# Patient Record
Sex: FEMALE | Race: WHITE | NOT HISPANIC OR LATINO | Employment: FULL TIME | URBAN - METROPOLITAN AREA
[De-identification: names, ages, dates, MRNs, and addresses within clinical notes are randomized per-mention and may not be internally consistent; named-entity substitution may affect disease eponyms.]

---

## 2018-06-14 LAB — PAP-ABSTRACT: NORMAL

## 2021-03-01 NOTE — PATIENT INSTRUCTIONS
Please send me a [employer] message with an update as to how you are doing in 2 weeks.  Contact me at any time if you are having concerning side effects with the medication.  You should note some improvement though it may be minor within 2 weeks of starting medication.  If tolerating well I would like to see you back in clinic in 4-6 weeks for an office visit.   Counseling is really important to your care.   Psychology today website/provider finder feature.    Welcome to [facility], where we are committed to the art of inspired primary care.  Thank you for choosing us to be a part of your well-being.    The clinic is open Monday through Friday, 8:00 a.m. - 5:00 p.m., and Saturdays from 8:00 a.m. - 12:00 p.m.  After-hours questions are directed to our 24-hour nurse line, which can be reached by calling the clinic at [phone].  You can also contact the clinic through [employer], our online patient portal.  (Please allow 1-2 business days for a response via [employer].)  If you are not already enrolled in [employer], access instructions are below.    If you need a refill on your prescription, please contact the pharmacy where you filled it, and they will contact our clinic with the details of what is needed.  If your prescription is a controlled substance, you will have a conversation with your provider to determine if you would like to  your prescription at the clinic or have it mailed to your pharmacy.  Please allow 2-3 business days for all refill requests to be handled.    We look forward to providing you with great care!    Preventive Health Recommendations  Female Ages 21 to 25     Yearly exam:     See your health care provider every year in order to  o Review health changes.   o Discuss preventive care.    o Review your medicines if your doctor has prescribed any.      You should be tested each year for STDs (sexually transmitted diseases).       Talk to your provider about  how often you should have cholesterol testing.      Get a Pap test every three years. If you have an abnormal result, your doctor may have you test more often.      If you are at risk for diabetes, you should have a diabetes test (fasting glucose).     Shots:     Get a flu shot each year.     Get a tetanus shot every 10 years.     Consider getting the shot (vaccine) that prevents cervical cancer (Gardasil).    Nutrition:     Eat at least 5 servings of fruits and vegetables each day.    Eat whole-grain bread, whole-wheat pasta and brown rice instead of white grains and rice.    Get adequate Calcium and Vitamin D.     Lifestyle    Exercise at least 150 minutes a week each week (30 minutes a day, 5 days a week). This will help you control your weight and prevent disease.    Limit alcohol to one drink per day.    No smoking.     Wear sunscreen to prevent skin cancer.    See your dentist every six months for an exam and cleaning.  Patient Education     Understanding Generalized Anxiety Disorder (ASHANTI)  Anxiety can fill you with worry and fear. Sometimes anxiety is healthy. It alerts you to a potential threat and gets you to respond and take action. But for some people, anxiety gets so bad it causes problems in daily life. If you find yourself in a constant state of anxiety, you may have an anxiety disorder called generalized anxiety disorder (ASHANTI). Speak with your healthcare provider or mental health professional to learn more. He or she can help.      What is generalized anxiety disorder?  ASHANTI means that you are worrying constantly and can t control the worrying. Healthcare providers diagnose ASHANTI when your worrying happens on most days and for at least 6 months.   With ASHANTI, you might worry about money, your family and friends, work, or the world in general. You might not even be sure what you're anxious about. But whatever it is, you have an intense fear that the worst will happen. These feelings never really go away. In  people age 65 and older, ASHANTI is one of the most commonly diagnosed anxiety disorders.  Many times it occurs with depression. This constant worry affects your quality of life and makes it hard to function. ASHANTI can cause physical symptoms, too.   What are common symptoms of generalized anxiety disorder?  People with ASHANTI often think they have a physical illness. The disorder can cause symptoms, such as:   Muscle tension, especially in the neck and shoulders  Nausea and stomach problems  Frequent headaches  Feeling lightheaded  Restlessness, trouble sleeping  Feeling irritable and on edge all the time  How can generalized anxiety disorder be treated?  ASHANTI can be treated with medicine, talk therapy (also called counseling), or both. Medicine helps to reduce symptoms, so you can continue with your daily routine. Therapy helps you understand the cause of your anxiety and learn how to manage it. Both forms of treatment help you deal with problems that anxiety causes in your life. This helps you to be healthier and happier.   [employer] last reviewed this educational content on 5/1/2020 2000-2020 [employer]. [address] [place] [address], [place] [address]. All rights reserved. This information is not intended as a substitute for professional medical care. Always follow your healthcare professional's instructions.

## 2021-03-01 NOTE — PROGRESS NOTES
Assessment & Plan     Abdominal pain, generalized: Unclear etiology suspect there may be component of irritable bowel syndrome but also wonder about gastroparesis as well as other causes of chronic constipation.  - GASTROENTEROLOGY ADULT REF CONSULT ONLY; Future    Nausea  - GASTROENTEROLOGY ADULT REF CONSULT ONLY; Future    ASHANTI (generalized anxiety disorder): Risks and benefits of treatment discussed.  Lifestyle treatments discussed and encouraged.  Counseling recommended.  Also think it would be interesting to see if treatment of anxiety has any effect at all on her abdominal issues.  Certainly it may help to mitigate them at least a bit.  - FLUoxetine (PROZAC) 10 MG capsule; Take 1 capsule (10 mg) by mouth daily Can increase to 2 capsules after 7 days if tolerating well.  Please send me a [employer] message with an update as to how you are doing in 2 weeks.  Contact me at any time if you are having concerning side effects with the medication.  You should note some improvement though it may be minor within 2 weeks of starting medication.  If tolerating well I would like to see you back in clinic in 4-6 weeks for an office visit.   Counseling is really important to your care.   Psychology today website/provider finder feature.  Chronic constipation  - GASTROENTEROLOGY ADULT REF CONSULT ONLY; Future  Recommended drinking plenty of fluids throughout the day.    Fiber supplement with fiber Gummies start with 1 twice daily increasing by 1 each week to a maximum of 3 twice daily.    Mild episode of recurrent major depressive disorder (H)  Same as above    Abdominal pain, epigastric  - GASTROENTEROLOGY ADULT REF CONSULT ONLY; Future  Encouraged her to give a trial of omeprazole 20 mg over-the-counter once daily at least until she gets in to see the gastroenterologist.    May-Thurner syndrome  Change from 481 mg baby aspirin to 1 enteric-coated 325 mg aspirin    History of pulmonary embolism    Hx of  "hemorrhoids    Vitamin D deficiency  Recommended vitamin D supplement every day 2000 international units winter spring summer and fall.    Consider physical in the next month or so.  Her follow-up on anxiety can be scheduled as a physical and we can take care of both problems at that time as things are progressing favorably.        60 minutes spent on the date of the encounter doing chart review, review of outside records, review of test results, interpretation of tests, patient visit and documentation        BMI:   Estimated body mass index is 29.69 kg/m  as calculated from the following:    Height as of this encounter: 1.593 m (5' 2.72\").    Weight as of this encounter: 75.4 kg (166 lb 1.9 oz).           Return in about 4 weeks (around 3/30/2021).    Daly Johnson PA-C  [facility]    Subjective   Freida is a 25 year old new patient to our clinic.  Appointment was made for physical but she has several more acute issues we chose to focus on today.  These primarily revolve around to main issues:    Problem #1: Generalized abdominal and pelvic pain associated with nausea, chronic constipation, gas and bloating.  She has been seen for this in the past but due to [place] to different [place] because of schooling and work she has not been able to get consistent follow-up care.  Most recent evaluation for similar issues [facility]  At that note and evaluation were reviewed. She is somewhat frustrated at not getting to the bottom of what is going on.  Description: Stomach issues started for 5 years ago when she was in college.  Character: Sharp stabbing abdominal pain and pelvic pain usually worse just prior to BM. Occurs in multiple areas all over the lower abdomen.  Occurs for seconds at a time.    Pain is 8/10 at its worse.  No pain curently. Has pain most everyday but occasionally no pain.  Location: throughout the lower abdomen.   Radiation: None  Intensity: moderate--Does not miss " work but says she cannot do njormal daily activities.  Progression of Symptoms:  same and waxing and waning  Accompanying Signs & Symptoms:  Fever/Chills: no  Gas/Bloating: yes  Nausea: YES--nausea can be quite severe and persistent.  There is no associated vomiting and she has a severe phobia of vomiting. Has severe nausea that is unrelated to the lower abdominal pain.  May be triggered by over eating.  Alcohol really aggravates her upper abdominal pain.  Vomitting: no  Diarrhea: no  Constipation: YES--sometimes.  She has an ongoing history with constipation.  Associated with this she has had bleeding hemorrhoids with hemorrhoidectomy.  She has had Botox injections rectally and treatment for fissure.  Dysuria or Hematuria: no  History:   Trauma: no  Previous similar pain: no  Previous tests done: [facility].  Precipitating factors:   Does the pain change with:     Food: YES    Bowel Movement: YES    Urination: no   Other factors:  no  Therapies tried and outcome:  Omeprazole did help GERD symptoms but she does not feel that is playing a role currently.    GYN history:  Periods: regular  Flow described as moderate  yes dysmenorrhea,  Currently sexually active: never    Contraception: Not needed  Patient's last menstrual period was 2021.  Vaginal symptoms: none  Concern for STD: no        Abnormal Mood Symptoms:  Known history of anxiety but has never been treated. Family history of anxiety.  Onset/Duration: Several years.  Current ongoing medical issues are playing a role.  She also has a very stressful job and has had several recent big life changes.  Description: Significant anxiety and worry over the past several years.  Has noted more depression sadness and feeling down related to her ongoing medical issues and concerns.  Depression (if yes, do PHQ-9): YES  Anxiety (if yes, do ASHANTI-7): YES  Accompanying Signs & Symptoms:  Still participating in activities that you used to enjoy: YES-but  "decreased  Fatigue: YES  Irritability: YES  Difficulty concentrating: YES  Changes in appetite: YES-related to her stomach issues.  Problems with sleep: YES  Heart racing/beating fast: YES  Abnormally elevated, expansive, or irritable mood: no  Persistently increased activity or energy: no  Thoughts of hurting yourself or others: no  History:  Recent stress or major life event: YES-Covid pandemic, completing schooling, moved to a new state, new job  Prior depression or anxiety: Long standing history of anxiety no previous treatment.  Family history of depression or anxiety: YES  Alcohol/drug use: no  Difficulty sleeping: no  Precipitating or alleviating factors: None  Therapies tried and outcome: none  PHQ 3/2/2021   PHQ-9 Total Score 8   Q9: Thoughts of better off dead/self-harm past 2 weeks Not at all     ASHANTI-7 SCORE 3/2/2021   Total Score 13     Other important past medical history: Large inguinal DVT with bilateral diffuse pulmonary emboli.  Occurred when she was on estrogen-containing birth control pills.  Work-up revealed May Thurner syndrome.  She was on anticoagulants for 1 to 2 years.  Was found to be allergic to clopidogrel and Xarelto.  And was then placed on Coumadin.  She had inguinal femoral stent placed following embolectomy.  Currently taking 4 baby aspirin daily.  Vitamin D deficiency    Review of Systems   Constitutional, HEENT, cardiovascular, pulmonary, GI, , musculoskeletal, neuro, skin, endocrine and psych systems are negative, except as otherwise noted.      Objective    /79   Pulse 105   Temp 97.3  F (36.3  C)   Ht 1.593 m (5' 2.72\")   Wt 75.4 kg (166 lb 1.9 oz)   LMP 02/20/2021   SpO2 95%   Breastfeeding No   BMI 29.69 kg/m    Body mass index is 29.69 kg/m .  Physical Exam   GENERAL: healthy, alert and no distress  NECK: no adenopathy, no asymmetry, masses, or scars and thyroid normal to palpation.  No bruits  RESP: lungs clear to auscultation - no rales, rhonchi or " wheezes  CV: regular rate and rhythm, normal S1 S2, no S3 or S4, no murmur, click or rub, no peripheral edema and peripheral pulses strong  ABDOMEN: Hypoactive bowel sounds throughout.  No hepatosplenomegaly.  No palpable masses.  Mild tenderness in the epigastric area as well as generally throughout the lower abdomen without rebound or guarding.  MS: no gross musculoskeletal defects noted, no clubbing, edema or cyanosis of extremities.  SKIN: no suspicious lesions or rashes  PSYCH: well dressed and groomed.  Good eye contact and is cooperative. Thoughts linear.  No delusions, compulsions or paranoia.  Affect flat.  Patient denies homicidal and suicidal ideation as well as no thoughts or actions of self-harm.

## 2021-03-02 ENCOUNTER — OFFICE VISIT (OUTPATIENT)
Dept: FAMILY MEDICINE | Facility: CLINIC | Age: 26
End: 2021-03-02

## 2021-03-02 VITALS
SYSTOLIC BLOOD PRESSURE: 126 MMHG | OXYGEN SATURATION: 95 % | DIASTOLIC BLOOD PRESSURE: 79 MMHG | HEIGHT: 63 IN | TEMPERATURE: 97.3 F | WEIGHT: 166.12 LBS | HEART RATE: 105 BPM | BODY MASS INDEX: 29.43 KG/M2

## 2021-03-02 DIAGNOSIS — F41.1 GAD (GENERALIZED ANXIETY DISORDER): ICD-10-CM

## 2021-03-02 DIAGNOSIS — Z87.19 HX OF HEMORRHOIDS: ICD-10-CM

## 2021-03-02 DIAGNOSIS — R10.13 ABDOMINAL PAIN, EPIGASTRIC: ICD-10-CM

## 2021-03-02 DIAGNOSIS — F33.0 MILD EPISODE OF RECURRENT MAJOR DEPRESSIVE DISORDER (H): ICD-10-CM

## 2021-03-02 DIAGNOSIS — R10.84 ABDOMINAL PAIN, GENERALIZED: Primary | ICD-10-CM

## 2021-03-02 DIAGNOSIS — I87.1 MAY-THURNER SYNDROME: ICD-10-CM

## 2021-03-02 DIAGNOSIS — E55.9 VITAMIN D DEFICIENCY: ICD-10-CM

## 2021-03-02 DIAGNOSIS — R11.0 NAUSEA: ICD-10-CM

## 2021-03-02 DIAGNOSIS — K59.09 CHRONIC CONSTIPATION: ICD-10-CM

## 2021-03-02 DIAGNOSIS — Z86.711 HISTORY OF PULMONARY EMBOLISM: ICD-10-CM

## 2021-03-02 PROBLEM — Z83.49 FAMILY HISTORY OF HYPOTHYROIDISM: Status: ACTIVE | Noted: 2020-02-14

## 2021-03-02 PROBLEM — I82.419 ACUTE DEEP VEIN THROMBOSIS (DVT) OF FEMORAL VEIN (H): Status: ACTIVE | Noted: 2021-03-02

## 2021-03-02 PROBLEM — K21.9 GASTROESOPHAGEAL REFLUX DISEASE: Status: ACTIVE | Noted: 2020-02-14

## 2021-03-02 RX ORDER — MULTIPLE VITAMINS W/ MINERALS TAB 9MG-400MCG
TAB ORAL
COMMUNITY
Start: 2020-03-01

## 2021-03-02 RX ORDER — FLUOXETINE 10 MG/1
10 CAPSULE ORAL DAILY
Qty: 60 CAPSULE | Refills: 1 | Status: SHIPPED | OUTPATIENT
Start: 2021-03-02 | End: 2021-04-21

## 2021-03-02 RX ORDER — ASPIRIN 81 MG/1
81 TABLET, CHEWABLE ORAL
COMMUNITY
Start: 2020-02-14 | End: 2022-06-09

## 2021-03-02 RX ORDER — NITROFURANTOIN MACROCRYSTAL 100 MG
CAPSULE ORAL
COMMUNITY
Start: 2021-02-28 | End: 2021-04-21

## 2021-03-02 RX ORDER — VITAMIN B COMPLEX
TABLET ORAL
COMMUNITY
Start: 2020-02-16

## 2021-03-02 SDOH — HEALTH STABILITY: MENTAL HEALTH: HOW OFTEN DO YOU HAVE A DRINK CONTAINING ALCOHOL?: NEVER

## 2021-03-02 SDOH — HEALTH STABILITY: MENTAL HEALTH: HOW MANY STANDARD DRINKS CONTAINING ALCOHOL DO YOU HAVE ON A TYPICAL DAY?: NOT ASKED

## 2021-03-02 SDOH — HEALTH STABILITY: MENTAL HEALTH: HOW OFTEN DO YOU HAVE 6 OR MORE DRINKS ON ONE OCCASION?: NEVER

## 2021-03-02 ASSESSMENT — ANXIETY QUESTIONNAIRES
3. WORRYING TOO MUCH ABOUT DIFFERENT THINGS: MORE THAN HALF THE DAYS
7. FEELING AFRAID AS IF SOMETHING AWFUL MIGHT HAPPEN: SEVERAL DAYS
5. BEING SO RESTLESS THAT IT IS HARD TO SIT STILL: SEVERAL DAYS
1. FEELING NERVOUS, ANXIOUS, OR ON EDGE: NEARLY EVERY DAY
6. BECOMING EASILY ANNOYED OR IRRITABLE: SEVERAL DAYS
IF YOU CHECKED OFF ANY PROBLEMS ON THIS QUESTIONNAIRE, HOW DIFFICULT HAVE THESE PROBLEMS MADE IT FOR YOU TO DO YOUR WORK, TAKE CARE OF THINGS AT HOME, OR GET ALONG WITH OTHER PEOPLE: VERY DIFFICULT
GAD7 TOTAL SCORE: 13
2. NOT BEING ABLE TO STOP OR CONTROL WORRYING: NEARLY EVERY DAY

## 2021-03-02 ASSESSMENT — PATIENT HEALTH QUESTIONNAIRE - PHQ9
5. POOR APPETITE OR OVEREATING: MORE THAN HALF THE DAYS
SUM OF ALL RESPONSES TO PHQ QUESTIONS 1-9: 8

## 2021-03-02 ASSESSMENT — MIFFLIN-ST. JEOR: SCORE: 1473.15

## 2021-03-02 NOTE — NURSING NOTE
"25 year old  Chief Complaint   Patient presents with     [facility]     Physical     pap exam       Blood pressure 126/79, pulse 105, temperature 97.3  F (36.3  C), height 1.593 m (5' 2.72\"), weight 75.4 kg (166 lb 1.9 oz), SpO2 95 %, not currently breastfeeding. Body mass index is 29.69 kg/m .  There is no problem list on file for this patient.      Wt Readings from Last 2 Encounters:   03/02/21 75.4 kg (166 lb 1.9 oz)     BP Readings from Last 3 Encounters:   03/02/21 126/79         Current Outpatient Medications   Medication     aspirin (ASA) 81 MG chewable tablet     multivitamin w/minerals (MULTI-VITAMIN) tablet     nitroFURantoin macrocrystal (MACRODANTIN) 100 MG capsule     Vitamin D3 (CHOLECALCIFEROL) 25 mcg (1000 units) tablet     No current facility-administered medications for this visit.        Social History     Tobacco Use     Smoking status: Never Smoker     Smokeless tobacco: Never Used   Substance Use Topics     Alcohol use: Never     Frequency: Never     Binge frequency: Never     Drug use: Never       Health Maintenance Due   Topic Date Due     CHLAMYDIA SCREENING  1997     ADVANCE CARE PLANNING  1997     DTAP/TDAP/TD IMMUNIZATION (1 - Tdap) 06/14/2004     HPV IMMUNIZATION (1 - 2-dose series) 06/14/2008     HIV SCREENING  06/14/2012     HEPATITIS C SCREENING  06/14/2015     PAP  06/14/2018     PHQ-2  01/01/2021       No results found for: PAP      March 2, 2021 4:04 PM  "

## 2021-03-03 ASSESSMENT — ANXIETY QUESTIONNAIRES: GAD7 TOTAL SCORE: 13

## 2021-03-06 PROBLEM — R10.84 ABDOMINAL PAIN, GENERALIZED: Status: ACTIVE | Noted: 2021-03-06

## 2021-03-06 PROBLEM — F32.9 MAJOR DEPRESSION: Status: ACTIVE | Noted: 2021-03-06

## 2021-03-06 PROBLEM — R11.0 NAUSEA: Status: ACTIVE | Noted: 2021-03-06

## 2021-03-06 PROBLEM — I87.1 MAY-THURNER SYNDROME: Status: ACTIVE | Noted: 2021-03-06

## 2021-03-06 PROBLEM — Z86.711 HISTORY OF PULMONARY EMBOLISM: Status: ACTIVE | Noted: 2021-03-06

## 2021-03-06 PROBLEM — Z87.19 HX OF HEMORRHOIDS: Status: ACTIVE | Noted: 2020-02-14

## 2021-03-06 PROBLEM — E55.9 VITAMIN D DEFICIENCY: Status: ACTIVE | Noted: 2021-03-06

## 2021-03-06 PROBLEM — K59.09 CHRONIC CONSTIPATION: Status: ACTIVE | Noted: 2021-03-06

## 2021-03-06 SDOH — HEALTH STABILITY: MENTAL HEALTH: HOW MANY STANDARD DRINKS CONTAINING ALCOHOL DO YOU HAVE ON A TYPICAL DAY?: 1 OR 2

## 2021-03-07 ENCOUNTER — HEALTH MAINTENANCE LETTER (OUTPATIENT)
Age: 26
End: 2021-03-07

## 2021-04-07 NOTE — TELEPHONE ENCOUNTER
REFERRAL INFORMATION:    Referring Provider:  Dr Daly Johnson     Referring Clinic:  [facility]    Reason for Visit/Diagnosis: Ongoing generalized lower abdominal pain with nausea, chronic constipation     FUTURE VISIT INFORMATION:    Appointment Date: 4/21/21    Appointment Time: 4pm     NOTES STATUS DETAILS   OFFICE NOTE from Referring Provider Diane GRIFFIN @ [facility]:  3/2/21     OFFICE NOTE from Other Specialist Care Everywhere Julia Burgess @ [facility]:  2/14/20 7/29/19 Office visit with Dr. Juanito Latif ([facility])     6/25/19 Office visit with Dr. Abi Santiago ([facility])    HOSPITAL DISCHARGE SUMMARY/  ED VISITS N/A    OPERATIVE REPORT N/A    MEDICATION LIST Internal/ Care Everywhere         ENDOSCOPY  N/A    COLONOSCOPY N/A    ERCP N/A    EUS N/A    STOOL TESTING Care Everywhere [facility]:  2/14/20   PERTINENT LABS Care Everywhere    PATHOLOGY REPORTS (RELATED) N/A    IMAGING (CT, MRI, EGD, MRCP, Small Bowel Follow Through/SBT, MR/CT Enterography) N/A      Phone Call:  4/7/21 MV 6.06pm   Contact Name Freida Montemayor   Terence Called and left patient a voicemail to see where her external GI records are located in [place]. (Per appt notes, pt has outside recs). Anuradha Elliott's call back number [phone]     4/15/2021 2:23pm Called and spoke with Pt. Pt mentioned that she has outside recs with [facility]. Lucho

## 2021-04-21 ENCOUNTER — PRE VISIT (OUTPATIENT)
Dept: GASTROENTEROLOGY | Facility: CLINIC | Age: 26
End: 2021-04-21

## 2021-04-21 ENCOUNTER — VIRTUAL VISIT (OUTPATIENT)
Dept: GASTROENTEROLOGY | Facility: CLINIC | Age: 26
End: 2021-04-21
Payer: COMMERCIAL

## 2021-04-21 VITALS — HEIGHT: 63 IN | BODY MASS INDEX: 29.41 KG/M2 | WEIGHT: 166 LBS

## 2021-04-21 DIAGNOSIS — I87.1 MAY-THURNER SYNDROME: Primary | ICD-10-CM

## 2021-04-21 DIAGNOSIS — R10.13 ABDOMINAL PAIN, EPIGASTRIC: ICD-10-CM

## 2021-04-21 DIAGNOSIS — R11.0 NAUSEA: ICD-10-CM

## 2021-04-21 DIAGNOSIS — R10.84 ABDOMINAL PAIN, GENERALIZED: ICD-10-CM

## 2021-04-21 DIAGNOSIS — K59.09 CHRONIC CONSTIPATION: ICD-10-CM

## 2021-04-21 PROCEDURE — 99204 OFFICE O/P NEW MOD 45 MIN: CPT | Mod: 95 | Performed by: INTERNAL MEDICINE

## 2021-04-21 ASSESSMENT — MIFFLIN-ST. JEOR: SCORE: 1477.1

## 2021-04-21 ASSESSMENT — PAIN SCALES - GENERAL: PAINLEVEL: NO PAIN (0)

## 2021-04-21 NOTE — PROGRESS NOTES
"Freida Aguilar is a 25 year old female who is being evaluated via a billable video visit.      The patient has been notified of following:     \"This video visit will be conducted via a call between you and your physician/provider. We have found that certain health care needs can be provided without the need for an in-person physical exam.  This service lets us provide the care you need with a video conversation.  If a prescription is necessary we can send it directly to your pharmacy.  If lab work is needed we can place an order for that and you can then stop by our lab to have the test done at a later time.    If during the course of the call the physician/provider feels a video visit is not appropriate, you will not be charged for this service.\"       Patient confirmed that they are in [place] for today's visit Yes    Video-Visit Details  Type of service:  Video Visit    Video Start Time: 4:25  Video End Time:  5:12    Originating Location (pt. Location): Other place of work    Distant Location (provider location):  [facility] [place]     Platform used: [facility]    GASTROENTEROLOGY NEW PATIENT VIDEO VISIT    CC/REFERRING MD:    Daly Johnson    REASON FOR CONSULTATION:   Daly Johnson for   Chief Complaint   Patient presents with     Consult     Virtual consult       HISTORY OF PRESENT ILLNESS:    Freida Aguilar is a 25 year old female who is being evaluated via a billable video visit.      Sx ongoing 5-6 yrs:  Cannot burp, acid reflux, gurgling gas, sharp pains lower abdomen prior to BM, nausea coming in waves, no vomiting.  Sometimes wakes up with what feels like a lump of bile in her throat.  Feels her symptoms are worse on stressful days.  Being social is somewhat stressful and this anxiety has increased during the pandemic.    Constipation is a symptom but this is not new.  Occasional streaks of blood in her stool.    2 yrs ago 9/2019 " presented to a GI clinic in [place].  S/p hemorrhoid procedure there and botox injection for anal fissure. - the plan was to review her above symptoms at a later appt however then the patient moved for a job.    Has tried PPI  Has also tried fiber gummies 12 g/day    Med hx/  On baby ASA qdaily.  5/2016 was dx'd with DVT May-Thurner syndrome which included admission to ICU and stent placement.      Social/  Works for [place] [employer] in [facility] - "[employer] technology running  Freelance: edits podcasts  Will start overnight shifts soon  Admits to stress and anxiety including social anxiety  From [place]    I have reviewed and updated the patient's Past Medical History, Social History, Family History and Medication List.    PERTINENT PAST MEDICAL HISTORY:  (I personally reviewed this history with the patient at today's visit)   No past medical history on file.      PREVIOUS SURGERIES: (I personally reviewed this history with the patient at today's visit)   Past Surgical History:   Procedure Laterality Date     HEMORRHOID SURGERY       IR STENT VASCULAR LT Left        ALLERGIES:     Allergies   Allergen Reactions     Clopidogrel Hives, Itching and Swelling     Rivaroxaban Hives and Itching       PERTINENT MEDICATIONS:    Current Outpatient Medications:      hyoscyamine SL (LEVSIN/SL) 0.125 MG sublingual tablet, Take 1 tablet (0.125 mg) by mouth 4 times daily as needed (abdominal cramping), Disp: 60 tablet, Rfl: 3     aspirin (ASA) 81 MG chewable tablet, Take 81 mg by mouth, Disp: , Rfl:      citalopram (CELEXA) 10 MG tablet, Take 1 tablet (10 mg) by mouth daily, Disp: 30 tablet, Rfl: 1     multivitamin w/minerals (MULTI-VITAMIN) tablet, , Disp: , Rfl:      Vitamin D3 (CHOLECALCIFEROL) 25 mcg (1000 units) tablet, , Disp: , Rfl:     SOCIAL HISTORY:  Social History     Occupational History     Not on file   Tobacco Use     Smoking status: Never Smoker     Smokeless tobacco: Never Used   Substance and Sexual Activity     Alcohol use:  Not Currently     Frequency: Never     Drinks per session: 1 or 2     Binge frequency: Never     Drug use: Not Currently     Sexual activity: Never     Partners: Male       FAMILY HISTORY:   Family History   Problem Relation Age of Onset     Kidney Cancer Maternal Grandfather      Lung Cancer Paternal Grandfather       The patient notes that her mother had a low T3 and normal TSH as presenting labs for dx of thyroid dz   F - hemorrhoids  No CRC  No colon polyps  No panc or liver dz  Unsure about IBD  M - food sensitivities      Review of Systems  Review of Systems   Constitutional: Positive for weight loss. Negative for diaphoresis and fever.        180 to 160 lbs over 2 yrs   Gastrointestinal: Positive for blood in stool and constipation. Negative for heartburn.        No dysphagia   Neurological: Positive for dizziness.        Dizziness improves if eats carb such as a donut        Exam:    General appearance:  Pleasant, in no acute distress  Ears, nose, mouth and throat: Hearing intact  Respiratory:  Normal respiration, no use of accessory muscles   Psychiatric:  Oriented to person, place and time, Appropriate mood and affect.       PERTINENT STUDIES have been reviewed.  2/2020 labs: glucose 69, H p stool ag neg      Impression/Recommendations:    Freida Aguilar is a 25 year old female who presents for evaluation of gas, nausea, and acid reflux.  She also has a long standing hx of constipation as well.  ddx includes celiac vs SIBO vs thyroid dz vs PUD vs other.  -- labs  -- trial Levsin for cramping prior to BM  -- carafate may be another option for reflux but this could worsen constipation  -- egd and colonoscopy - streaks of blood in stool likely related to fissure or hemorrhoids however colonoscopy for complete evaluation is warranted  -- before scheduling procedures, refer to Hematology for their opinion on medical management and imaging surveillance of patient with hx DVT/May-Thurners s/p stent  placement  -- conservative measures for now regarding constipation: 25g fiber/day, drinkable yogurt, squatty potty, fruit at bedtime or after dinner  -- consider breath test in future.  SIBO less likely in context of constipation   -- cont to monitor glucose which was low last year  RTC in 2-3 mo      Dionne Soliz MD      Thank you for this consultation.  It was a pleasure to participate in the care of this patient; please contact us with any further questions.      Time spent in appointment today was 47 minutes.

## 2021-04-21 NOTE — NURSING NOTE
"Chief Complaint   Patient presents with     Consult     Virtual consult       Vitals:    04/21/21 1530   Weight: 75.3 kg (166 lb)   Height: 1.6 m (5' 3\")       Body mass index is 29.41 kg/m .      CRIS BarnesT                      "

## 2021-04-21 NOTE — LETTER
"    4/21/2021         RE: Freida Aguilar  [address]  [place] [address]        Dear Colleague,    Thank you for referring your patient, Freida Aguilar, to the [facility] [place]. Please see a copy of my visit note below.    Freida Aguilar is a 25 year old female who is being evaluated via a billable video visit.      The patient has been notified of following:     \"This video visit will be conducted via a call between you and your physician/provider. We have found that certain health care needs can be provided without the need for an in-person physical exam.  This service lets us provide the care you need with a video conversation.  If a prescription is necessary we can send it directly to your pharmacy.  If lab work is needed we can place an order for that and you can then stop by our lab to have the test done at a later time.    If during the course of the call the physician/provider feels a video visit is not appropriate, you will not be charged for this service.\"       Patient confirmed that they are in [place] for today's visit Yes    Video-Visit Details  Type of service:  Video Visit    Video Start Time: 4:25  Video End Time:  5:12    Originating Location (pt. Location): Other place of work    Distant Location (provider location):  [facility] [place]     Platform used: [employer]    GASTROENTEROLOGY NEW PATIENT VIDEO VISIT    CC/REFERRING MD:    Daly Johnson    REASON FOR CONSULTATION:   Daly Johnson for   Chief Complaint   Patient presents with     Consult     Virtual consult       HISTORY OF PRESENT ILLNESS:    Freida Aguilar is a 25 year old female who is being evaluated via a billable video visit.      Sx ongoing 5-6 yrs:  Cannot burp, acid reflux, gurgling gas, sharp pains lower abdomen prior to BM, nausea coming in waves, no vomiting.  Sometimes wakes up with what feels like " a lump of bile in her throat.  Feels her symptoms are worse on stressful days.  Being social is somewhat stressful and this anxiety has increased during the pandemic.    Constipation is a symptom but this is not new.  Occasional streaks of blood in her stool.    2 yrs ago 9/2019 presented to a [place] clinic in [place].  S/p hemorrhoid procedure there and botox injection for anal fissure. - the plan was to review her above symptoms at a later appt however then the patient moved for a job.    Has tried PPI  Has also tried fiber gummies 12 g/day    Med hx/  On baby ASA qdaily.  5/2016 was dx'd with DVT May-Thurner syndrome which included admission to ICU and stent placement.      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    PERTINENT PAST MEDICAL HISTORY:  (I personally reviewed this history with the patient at today's visit)   No past medical history on file.      PREVIOUS SURGERIES: (I personally reviewed this history with the patient at today's visit)   Past Surgical History:   Procedure Laterality Date     HEMORRHOID SURGERY       IR STENT VASCULAR LT Left        ALLERGIES:     Allergies   Allergen Reactions     Clopidogrel Hives, Itching and Swelling     Rivaroxaban Hives and Itching       PERTINENT MEDICATIONS:    Current Outpatient Medications:      hyoscyamine SL (LEVSIN/SL) 0.125 MG sublingual tablet, Take 1 tablet (0.125 mg) by mouth 4 times daily as needed (abdominal cramping), Disp: 60 tablet, Rfl: 3     aspirin (ASA) 81 MG chewable tablet, Take 81 mg by mouth, Disp: , Rfl:      citalopram (CELEXA) 10 MG tablet, Take 1 tablet (10 mg) by mouth daily, Disp: 30 tablet, Rfl: 1     multivitamin w/minerals (MULTI-VITAMIN) tablet, , Disp: , Rfl:       Vitamin D3 (CHOLECALCIFEROL) 25 mcg (1000 units) tablet, , Disp: , Rfl:     SOCIAL HISTORY:  Social History     Occupational History     Not on file   Tobacco Use     Smoking status: Never Smoker     Smokeless tobacco: Never Used   Substance and Sexual Activity     Alcohol use: Not Currently     Frequency: Never     Drinks per session: 1 or 2     Binge frequency: Never     Drug use: Not Currently     Sexual activity: Never     Partners: Male       FAMILY HISTORY:   Family History   Problem Relation Age of Onset     Kidney Cancer Maternal Grandfather      Lung Cancer Paternal Grandfather       The patient notes that her mother had a low T3 and normal TSH as presenting labs for dx of thyroid dz   F - hemorrhoids  No CRC  No colon polyps  No panc or liver dz  Unsure about IBD  M - food sensitivities      Review of Systems  Review of Systems   Constitutional: Positive for weight loss. Negative for diaphoresis and fever.        180 to 160 lbs over 2 yrs   Gastrointestinal: Positive for blood in stool and constipation. Negative for heartburn.        No dysphagia   Neurological: Positive for dizziness.        Dizziness improves if eats carb such as a donut        Exam:    General appearance:  Pleasant, in no acute distress  Ears, nose, mouth and throat: Hearing intact  Respiratory:  Normal respiration, no use of accessory muscles   Psychiatric:  Oriented to person, place and time, Appropriate mood and affect.       PERTINENT STUDIES have been reviewed.  2/2020 labs: glucose 69, H p stool ag neg      Impression/Recommendations:    Freida Aguilar is a 25 year old female who presents for evaluation of gas, nausea, and acid reflux.  She also has a long standing hx of constipation as well.  ddx includes celiac vs SIBO vs thyroid dz vs PUD vs other.  -- labs  -- trial Levsin for cramping prior to BM  -- carafate may be another option for reflux but this could worsen constipation  -- egd and colonoscopy - streaks of blood in  stool likely related to fissure or hemorrhoids however colonoscopy for complete evaluation is warranted  -- before scheduling procedures, refer to Hematology for their opinion on medical management and imaging surveillance of patient with hx DVT/May-Thurners s/p stent placement  -- conservative measures for now regarding constipation: 25g fiber/day, drinkable yogurt, squatty potty, fruit at bedtime or after dinner  -- consider breath test in future.  SIBO less likely in context of constipation   -- cont to monitor glucose which was low last year  RTC in 2-3 mo      Dionne Soliz MD      Thank you for this consultation.  It was a pleasure to participate in the care of this patient; please contact us with any further questions.      Time spent in appointment today was 47 minutes.

## 2021-04-26 DIAGNOSIS — R11.0 NAUSEA: ICD-10-CM

## 2021-04-26 DIAGNOSIS — K59.09 CHRONIC CONSTIPATION: ICD-10-CM

## 2021-04-26 DIAGNOSIS — R10.84 ABDOMINAL PAIN, GENERALIZED: ICD-10-CM

## 2021-04-26 LAB
ALBUMIN SERPL-MCNC: 4.2 G/DL (ref 3.4–5)
ALP SERPL-CCNC: 46 U/L (ref 40–150)
ALT SERPL W P-5'-P-CCNC: 47 U/L (ref 0–50)
ANION GAP SERPL CALCULATED.3IONS-SCNC: 5 MMOL/L (ref 3–14)
AST SERPL W P-5'-P-CCNC: 22 U/L (ref 0–45)
BILIRUB SERPL-MCNC: 0.6 MG/DL (ref 0.2–1.3)
BUN SERPL-MCNC: 11 MG/DL (ref 7–30)
CALCIUM SERPL-MCNC: 9 MG/DL (ref 8.5–10.1)
CHLORIDE SERPL-SCNC: 105 MMOL/L (ref 94–109)
CO2 SERPL-SCNC: 29 MMOL/L (ref 20–32)
CREAT SERPL-MCNC: 0.87 MG/DL (ref 0.52–1.04)
ERYTHROCYTE [DISTWIDTH] IN BLOOD BY AUTOMATED COUNT: 11.2 % (ref 10–15)
GFR SERPL CREATININE-BSD FRML MDRD: >90 ML/MIN/{1.73_M2}
GLUCOSE SERPL-MCNC: 82 MG/DL (ref 70–99)
HCT VFR BLD AUTO: 39.8 % (ref 35–47)
HGB BLD-MCNC: 13.7 G/DL (ref 11.7–15.7)
MCH RBC QN AUTO: 30.8 PG (ref 26.5–33)
MCHC RBC AUTO-ENTMCNC: 34.4 G/DL (ref 31.5–36.5)
MCV RBC AUTO: 89 FL (ref 78–100)
PLATELET # BLD AUTO: 258 10E9/L (ref 150–450)
POTASSIUM SERPL-SCNC: 4.3 MMOL/L (ref 3.4–5.3)
PROT SERPL-MCNC: 7.8 G/DL (ref 6.8–8.8)
RBC # BLD AUTO: 4.45 10E12/L (ref 3.8–5.2)
SODIUM SERPL-SCNC: 139 MMOL/L (ref 133–144)
TSH SERPL DL<=0.005 MIU/L-ACNC: 3.16 MU/L (ref 0.4–4)
VIT B12 SERPL-MCNC: 706 PG/ML (ref 193–986)
WBC # BLD AUTO: 7.6 10E9/L (ref 4–11)

## 2021-04-26 PROCEDURE — 82746 ASSAY OF FOLIC ACID SERUM: CPT | Mod: 90 | Performed by: PATHOLOGY

## 2021-04-26 PROCEDURE — 36415 COLL VENOUS BLD VENIPUNCTURE: CPT | Performed by: PATHOLOGY

## 2021-04-26 PROCEDURE — 82607 VITAMIN B-12: CPT | Performed by: PATHOLOGY

## 2021-04-26 PROCEDURE — 85027 COMPLETE CBC AUTOMATED: CPT | Performed by: PATHOLOGY

## 2021-04-26 PROCEDURE — 84481 FREE ASSAY (FT-3): CPT | Mod: 90 | Performed by: PATHOLOGY

## 2021-04-26 PROCEDURE — 99000 SPECIMEN HANDLING OFFICE-LAB: CPT | Performed by: PATHOLOGY

## 2021-04-26 PROCEDURE — 83516 IMMUNOASSAY NONANTIBODY: CPT | Mod: 90 | Performed by: PATHOLOGY

## 2021-04-26 PROCEDURE — 80053 COMPREHEN METABOLIC PANEL: CPT | Performed by: PATHOLOGY

## 2021-04-26 PROCEDURE — 82784 ASSAY IGA/IGD/IGG/IGM EACH: CPT | Mod: 90 | Performed by: PATHOLOGY

## 2021-04-26 PROCEDURE — 84443 ASSAY THYROID STIM HORMONE: CPT | Performed by: PATHOLOGY

## 2021-04-27 LAB
FOLATE SERPL-MCNC: 41.6 NG/ML
IGA SERPL-MCNC: 56 MG/DL (ref 84–499)
T3FREE SERPL-MCNC: 2.6 PG/ML (ref 2.3–4.2)
TTG IGA SER-ACNC: <1 U/ML
TTG IGG SER-ACNC: <1 U/ML

## 2021-04-29 ENCOUNTER — TELEPHONE (OUTPATIENT)
Dept: VASCULAR SURGERY | Facility: CLINIC | Age: 26
End: 2021-04-29

## 2021-04-29 NOTE — TELEPHONE ENCOUNTER
Called pt to fup on May Thurner referral    Left a vm    Asked that she return my call so that we can talk further in detail.    Karen POWERS RN, BSN  Interventional Radiology/Vascular  Nurse Coordinator   Phone: [phone]  Fax: [phone]

## 2021-04-30 DIAGNOSIS — R76.8 LOW SERUM IGA FOR AGE: Primary | ICD-10-CM

## 2021-04-30 DIAGNOSIS — R11.0 NAUSEA: ICD-10-CM

## 2021-04-30 ASSESSMENT — ENCOUNTER SYMPTOMS
WEIGHT LOSS: 1
FEVER: 0
ROS GI COMMENTS: NO DYSPHAGIA
DIZZINESS: 1
CONSTIPATION: 1
BLOOD IN STOOL: 1
DIAPHORESIS: 0
HEARTBURN: 0

## 2021-04-30 NOTE — RESULT ENCOUNTER NOTE
Partial IgA deficiency. Note to patient about this, and its impact on celiac serological testing, and further labs.

## 2021-04-30 NOTE — TELEPHONE ENCOUNTER
Called pt again to fup on referral re:May thurner     Informed her that we'd like to send her an POOL to see if she can fill It out and then send it back to us.     Informed her that I do see the medial records but will need images.     She verbalized understanding.     Karen POWERS RN, BSN  Interventional Radiology/Vascular  Nurse Coordinator   Phone: [phone]  Fax: [phone]

## 2021-05-06 ENCOUNTER — OFFICE VISIT (OUTPATIENT)
Dept: FAMILY MEDICINE | Facility: CLINIC | Age: 26
End: 2021-05-06

## 2021-05-06 VITALS
OXYGEN SATURATION: 96 % | BODY MASS INDEX: 30.02 KG/M2 | TEMPERATURE: 98.2 F | RESPIRATION RATE: 15 BRPM | HEART RATE: 88 BPM | HEIGHT: 63 IN | SYSTOLIC BLOOD PRESSURE: 116 MMHG | DIASTOLIC BLOOD PRESSURE: 78 MMHG | WEIGHT: 169.4 LBS

## 2021-05-06 DIAGNOSIS — F41.1 GAD (GENERALIZED ANXIETY DISORDER): ICD-10-CM

## 2021-05-06 DIAGNOSIS — K59.09 CHRONIC CONSTIPATION: ICD-10-CM

## 2021-05-06 DIAGNOSIS — R10.84 ABDOMINAL PAIN, GENERALIZED: Primary | ICD-10-CM

## 2021-05-06 RX ORDER — CITALOPRAM HYDROBROMIDE 20 MG/1
20 TABLET ORAL DAILY
Qty: 30 TABLET | Refills: 1 | Status: SHIPPED | OUTPATIENT
Start: 2021-05-06 | End: 2021-08-11

## 2021-05-06 ASSESSMENT — ANXIETY QUESTIONNAIRES
1. FEELING NERVOUS, ANXIOUS, OR ON EDGE: MORE THAN HALF THE DAYS
3. WORRYING TOO MUCH ABOUT DIFFERENT THINGS: SEVERAL DAYS
5. BEING SO RESTLESS THAT IT IS HARD TO SIT STILL: MORE THAN HALF THE DAYS
6. BECOMING EASILY ANNOYED OR IRRITABLE: MORE THAN HALF THE DAYS
7. FEELING AFRAID AS IF SOMETHING AWFUL MIGHT HAPPEN: MORE THAN HALF THE DAYS
IF YOU CHECKED OFF ANY PROBLEMS ON THIS QUESTIONNAIRE, HOW DIFFICULT HAVE THESE PROBLEMS MADE IT FOR YOU TO DO YOUR WORK, TAKE CARE OF THINGS AT HOME, OR GET ALONG WITH OTHER PEOPLE: SOMEWHAT DIFFICULT
2. NOT BEING ABLE TO STOP OR CONTROL WORRYING: MORE THAN HALF THE DAYS
GAD7 TOTAL SCORE: 12

## 2021-05-06 ASSESSMENT — PATIENT HEALTH QUESTIONNAIRE - PHQ9
5. POOR APPETITE OR OVEREATING: SEVERAL DAYS
SUM OF ALL RESPONSES TO PHQ QUESTIONS 1-9: 3

## 2021-05-06 ASSESSMENT — MIFFLIN-ST. JEOR: SCORE: 1492.39

## 2021-05-06 NOTE — NURSING NOTE
"25 year old  Chief Complaint   Patient presents with     Follow Up     from recent establish care        Blood pressure 116/78, pulse 88, temperature 98.2  F (36.8  C), temperature source Skin, resp. rate 15, height 1.6 m (5' 2.99\"), weight 76.8 kg (169 lb 6.4 oz), last menstrual period 04/15/2021, SpO2 96 %, not currently breastfeeding. Body mass index is 30.02 kg/m .  Patient Active Problem List   Diagnosis     Abdominal pain, epigastric     Family history of hypothyroidism     Gastroesophageal reflux disease     History of DVT (deep vein thrombosis)     Hx of hemorrhoids     May-Thurner syndrome     History of pulmonary embolism     Major depression     Abdominal pain, generalized     Vitamin D deficiency     Chronic constipation     Nausea       Wt Readings from Last 2 Encounters:   05/06/21 76.8 kg (169 lb 6.4 oz)   04/21/21 75.3 kg (166 lb)     BP Readings from Last 3 Encounters:   05/06/21 116/78   03/02/21 126/79         Current Outpatient Medications   Medication     aspirin (ASA) 81 MG chewable tablet     citalopram (CELEXA) 10 MG tablet     hyoscyamine SL (LEVSIN/SL) 0.125 MG sublingual tablet     multivitamin w/minerals (MULTI-VITAMIN) tablet     Vitamin D3 (CHOLECALCIFEROL) 25 mcg (1000 units) tablet     No current facility-administered medications for this visit.        Social History     Tobacco Use     Smoking status: Never Smoker     Smokeless tobacco: Never Used   Substance Use Topics     Alcohol use: Not Currently     Frequency: Never     Drinks per session: 1 or 2     Binge frequency: Never     Drug use: Not Currently       Health Maintenance Due   Topic Date Due     PREVENTIVE CARE VISIT  Never done     ADVANCE CARE PLANNING  Never done     DEPRESSION ACTION PLAN  Never done     HPV IMMUNIZATION (2 - 2-dose series) 12/14/2008     HIV SCREENING  Never done     HEPATITIS C SCREENING  Never done     DTAP/TDAP/TD IMMUNIZATION (2 - Td) 07/23/2019     COVID-19 Vaccine (2 - Moderna 2-dose series) " 05/03/2021     PAP  06/14/2021       No results found for: PAP      May 6, 2021 4:21 PM

## 2021-05-07 DIAGNOSIS — R11.0 NAUSEA: ICD-10-CM

## 2021-05-07 DIAGNOSIS — R76.8 LOW SERUM IGA FOR AGE: ICD-10-CM

## 2021-05-07 PROCEDURE — 82784 ASSAY IGA/IGD/IGG/IGM EACH: CPT | Mod: 90 | Performed by: PATHOLOGY

## 2021-05-07 PROCEDURE — 36415 COLL VENOUS BLD VENIPUNCTURE: CPT | Performed by: PATHOLOGY

## 2021-05-07 PROCEDURE — 83516 IMMUNOASSAY NONANTIBODY: CPT | Mod: 90 | Performed by: PATHOLOGY

## 2021-05-07 ASSESSMENT — ANXIETY QUESTIONNAIRES: GAD7 TOTAL SCORE: 12

## 2021-05-07 NOTE — PROGRESS NOTES
Assessment & Plan     Abdominal pain, generalized  Improved somewhat .  Continue working with GI specialist.    Some improvements noted  Chart reviewed.    ASHANTI (generalized anxiety disorder)  Medication has been helping for overall anxiety with little to no side effcts.  Could possibly be helping for abdominal pain as well. Increase to 20 mg daily.  Send me a [employer] messge with how you are doing in the next 2-3 weeks.  Let me know sooner if you are having concerning side effects.  - citalopram (CELEXA) 20 MG tablet; Take 1 tablet (20 mg) by mouth daily    Chronic constipation  Improving continue conservative management as per GI. Having trouble getting enough fiber--consider fiber gummies and make sure to drink plenty of water.    Has upcoming appointment with hematology to establish care related to coagulapathy.    Patient Instructions   Please send me a [employer] message with how you are doing over the next 3 weeks with the increase in medication. If everything is going OK we will plan on follow up in 3 months.                     Return in about 3 months (around 8/6/2021).    Daly Johnson PA-C  [facility]    Subjective   Freida is a 25 year old who presents for the following health issues     HPI     Anxiety Follow-Up:  Recent start of medication.  She denies significant improvement but also has had no side effects.  She is willing to increase dose and see how she does with that.    She has been working with Dr. Soliz in GI and her stomach is doing a bit better. She has been sleeping OK and work is going OK    How are you doing with your anxiety since your last visit? No change    Are you having other symptoms that might be associated with anxiety? Yes:  Possibly playing a role in her abdominal pain.    Have you had a significant life event? OTHER: Recent job change and move     Are you feeling depressed? No    Do you have any concerns with your use of alcohol or other drugs? No    Social History  "    Tobacco Use     Smoking status: Never Smoker     Smokeless tobacco: Never Used   Substance Use Topics     Alcohol use: Not Currently     Frequency: Never     Drinks per session: 1 or 2     Binge frequency: Never     Drug use: Not Currently     ASHANTI-7 SCORE 3/2/2021 5/6/2021   Total Score 13 12     PHQ 3/2/2021 5/6/2021   PHQ-9 Total Score 8 3   Q9: Thoughts of better off dead/self-harm past 2 weeks Not at all Not at all       Review of Systems   Constitutional, HEENT, cardiovascular, pulmonary, gi and gu systems are negative, except as otherwise noted.      Objective    /78 (BP Location: Right arm, Patient Position: Sitting, Cuff Size: Adult Regular)   Pulse 88   Temp 98.2  F (36.8  C) (Skin)   Resp 15   Ht 1.6 m (5' 2.99\")   Wt 76.8 kg (169 lb 6.4 oz)   LMP 04/15/2021 (Exact Date)   SpO2 96%   BMI 30.02 kg/m    Body mass index is 30.02 kg/m .  Physical Exam   GENERAL: healthy, alert and no distress  PSYCH: well dressed and groomed.  Good eye contact and is cooperative. Thoughts linear.  No delusions, compulsions or paranoia.  Affect calm and bright/much improved.  Patient denies homicidal and suicidal ideation as well as no thoughts or actions of self-harm.                "

## 2021-05-10 LAB
GLIADIN IGA SER-ACNC: <1 U/ML
GLIADIN IGG SER-ACNC: 1 U/ML

## 2021-05-11 LAB
IGA SERPL-MCNC: 52 MG/DL (ref 84–499)
IGG SERPL-MCNC: 870 MG/DL (ref 610–1616)
IGM SERPL-MCNC: 144 MG/DL (ref 35–242)

## 2021-05-18 NOTE — PATIENT INSTRUCTIONS
Please send me a [employer] message with how you are doing over the next 3 weeks with the increase in medication. If everything is going OK we will plan on follow up in 3 months.

## 2021-05-19 ENCOUNTER — TEAM CONFERENCE (OUTPATIENT)
Dept: VASCULAR SURGERY | Facility: CLINIC | Age: 26
End: 2021-05-19

## 2021-05-19 DIAGNOSIS — I87.1 MAY-THURNER SYNDROME: Primary | ICD-10-CM

## 2021-05-19 NOTE — TELEPHONE ENCOUNTER
Called [facility] to further inquire on medical records and imaging     Informed the person that I did get some medical records and scan see the note in [facility], however I am calling to inquire on if imaging was sent.     Apparently they have no documentation if the file room has sent over images to us.    They have informed us that the patient should request this information.    *Will notify pt.     *Called pt and left a msg for pt re: this information and that I will also send a [employer] msg to her.     Karen POWERS RN, BSN  Interventional Radiology/Vascular  Nurse Coordinator   Phone: [phone]  Fax: [phone]

## 2021-05-28 ENCOUNTER — ANCILLARY PROCEDURE (OUTPATIENT)
Dept: ULTRASOUND IMAGING | Facility: CLINIC | Age: 26
End: 2021-05-28
Attending: RADIOLOGY
Payer: COMMERCIAL

## 2021-05-28 DIAGNOSIS — I87.1 MAY-THURNER SYNDROME: ICD-10-CM

## 2021-05-28 PROCEDURE — 93971 EXTREMITY STUDY: CPT | Mod: LT | Performed by: RADIOLOGY

## 2021-05-28 PROCEDURE — 93978 VASCULAR STUDY: CPT | Performed by: RADIOLOGY

## 2021-06-01 NOTE — TELEPHONE ENCOUNTER
DIAGNOSIS: New may thurner consult for Sagastume   Reason: continuity of care    DATE RECEIVED: 6.2.21   NOTES STATUS DETAILS   OFFICE NOTE from referring provider Internal 4.21.21  Dr. Dionne Soliz  [facility]   OFFICE NOTE from other specialist Internal 5.6.21, 3.2.21  Dayl Johnson PA-C  [facility]    12.7.17, 11.29.16, 6.2.16  Dr. Haile Garza  [facility]    5.17-5.21.16  Dr. Cruz [facility]   OPERATIVE REPORT na    MEDICATION LIST Internal    PERTINENT LABS Internal    CTA (CT ANGIOGRAPHY) na    CT na    MRI na    ULTRASOUND Internal 5.28.21  US Low Ext Venous Duplex Left    5.28.21  US Aorta/IVC/IliacDuplex Comp

## 2021-06-02 ENCOUNTER — PRE VISIT (OUTPATIENT)
Dept: VASCULAR SURGERY | Facility: CLINIC | Age: 26
End: 2021-06-02

## 2021-06-02 ENCOUNTER — VIRTUAL VISIT (OUTPATIENT)
Dept: VASCULAR SURGERY | Facility: CLINIC | Age: 26
End: 2021-06-02
Payer: COMMERCIAL

## 2021-06-02 DIAGNOSIS — I87.1 MAY-THURNER SYNDROME: Primary | ICD-10-CM

## 2021-06-02 PROCEDURE — 99202 OFFICE O/P NEW SF 15 MIN: CPT | Mod: 95 | Performed by: RADIOLOGY

## 2021-06-02 ASSESSMENT — PAIN SCALES - GENERAL: PAINLEVEL: NO PAIN (0)

## 2021-06-02 NOTE — PROGRESS NOTES
Freida is a 25 year old who is being evaluated via a billable video visit.      How would you like to obtain your AVS? MyChart  If the video visit is dropped, the invitation should be resent by: Text to cell phone: [phone] [employer]  Will anyone else be joining your phonevisit? No        Subjective   Freida is a 25 year old who presents to establish care.    HPI     Ms. Aguilar is a healthy 27 yo with a remote history of LLE DVT due to May Thurner syndrome and stenting. Everything is going well without new swelling or symptoms of obstruction. She needs clearance prior to a GI procedure.     She is also needs to establish care going forward for her May Thurner syndrome. She is taking 81 mg ASA, otherwise no anticoagulation.     Review of Systems         Objective    Vitals - Patient Reported  Pain Score: No Pain (0)      Physical Exam     Deferred due to telephone visit.    Imaging:     I reviewed the left lower extremity and pelvic duplex ultrasound which demonstrates:       No deep venous thrombosis demonstrated in the LEFT leg.    Left iliac venous stent fills [id number] in color  Doppler images. Negative study.    A/P: 27 yo with history of May Thurner syndrome without any concerning symptoms of stenosis/obstruction, imaging reveals patent stent. Here to establish care and receive clearance prior to GI procedure.     She is taking 81 mg ASA and wants to know if she should continue. I explained this is not my recommendation for long term care of patients with May Thurner syndrome with established durable stent patency. She has no thrombophilia during work up back when she initially presented.     She can proceed with her GI procedure without any concerns.    Video-Visit Details    Type of service:  Telephone      Originating Location (pt. Location): Home    Distant Location (provider location):  [facility] [place]

## 2021-06-02 NOTE — LETTER
6/2/2021       RE: Freida Aguilar  [address]  [place] [address]     Dear Colleague,    Thank you for referring your patient, Freida Aguilar, to the [facility] [place] at . Please see a copy of my visit note below.    Freida is a 25 year old who is being evaluated via a billable video visit.      How would you like to obtain your AVS? MyChart  If the video visit is dropped, the invitation should be resent by: Text to cell phone: [phone] [employer]  Will anyone else be joining your phonevisit? No        Subjective   Freida is a 25 year old who presents to establish care.    HPI     Ms. Aguilar is a healthy 25 yo with a remote history of LLE DVT due to May Thurner syndrome and stenting. Everything is going well without new swelling or symptoms of obstruction. She needs clearance prior to a GI procedure.     She is also needs to establish care going forward for her May Thurner syndrome. She is taking 81 mg ASA, otherwise no anticoagulation.     Review of Systems         Objective    Vitals - Patient Reported  Pain Score: No Pain (0)      Physical Exam     Deferred due to telephone visit.    Imaging:     I reviewed the left lower extremity and pelvic duplex ultrasound which demonstrates:       No deep venous thrombosis demonstrated in the LEFT leg.    Left iliac venous stent fills [id number] in color  Doppler images. Negative study.    A/P: 25 yo with history of May Thurner syndrome without any concerning symptoms of stenosis/obstruction, imaging reveals patent stent. Here to establish care and receive clearance prior to GI procedure.     She is taking 81 mg ASA and wants to know if she should continue. I explained this is not my recommendation for long term care of patients with May Thurner syndrome with established durable stent patency. She has no thrombophilia during work up back when she initially presented.      She can proceed with her GI procedure without any concerns.    Video-Visit Details    Type of service:  Telephone      Originating Location (pt. Location): Home    Distant Location (provider location):  [facility] [place]           Again, thank you for allowing me to participate in the care of your patient.      Sincerely,    Micha Sagastume MD

## 2021-06-02 NOTE — NURSING NOTE
Vascular Rooming Note     Freida Aguilar's goals for this visit include:   Chief Complaint   Patient presents with     Consult     Freida, is participating in a virtual visit today for a consult regarding May Thurner, feeling fine, no concerns, as reported by patient.     Belinda Rodriguez LPN

## 2021-06-05 NOTE — RESULT ENCOUNTER NOTE
Normal IgM and IgG, repeat lab confirms partial IgA deficiency. Further celiac testing is unremarkable.

## 2021-06-11 ENCOUNTER — TELEPHONE (OUTPATIENT)
Dept: GASTROENTEROLOGY | Facility: CLINIC | Age: 26
End: 2021-06-11

## 2021-06-11 NOTE — TELEPHONE ENCOUNTER
Spoke to pt about scheduling 2-3 month follow up with Dr. RC Soliz. Pt declined scheduling until she hears back from vascular, and will call back to schedule when she is ready.

## 2021-08-07 DIAGNOSIS — R14.0 BLOATING: ICD-10-CM

## 2021-08-07 DIAGNOSIS — K92.1 HEMATOCHEZIA: ICD-10-CM

## 2021-08-07 DIAGNOSIS — R11.0 NAUSEA: Primary | ICD-10-CM

## 2021-08-11 ENCOUNTER — VIRTUAL VISIT (OUTPATIENT)
Dept: GASTROENTEROLOGY | Facility: CLINIC | Age: 26
End: 2021-08-11
Payer: COMMERCIAL

## 2021-08-11 VITALS — WEIGHT: 160 LBS | BODY MASS INDEX: 28.35 KG/M2

## 2021-08-11 DIAGNOSIS — R11.0 NAUSEA: ICD-10-CM

## 2021-08-11 DIAGNOSIS — K63.8219 SMALL INTESTINAL BACTERIAL OVERGROWTH: Primary | ICD-10-CM

## 2021-08-11 DIAGNOSIS — K21.9 GASTROESOPHAGEAL REFLUX DISEASE, UNSPECIFIED WHETHER ESOPHAGITIS PRESENT: ICD-10-CM

## 2021-08-11 DIAGNOSIS — K92.1 HEMATOCHEZIA: ICD-10-CM

## 2021-08-11 DIAGNOSIS — K58.0 IRRITABLE BOWEL SYNDROME WITH DIARRHEA: ICD-10-CM

## 2021-08-11 DIAGNOSIS — R14.0 BLOATING: ICD-10-CM

## 2021-08-11 PROCEDURE — 99214 OFFICE O/P EST MOD 30 MIN: CPT | Mod: 95 | Performed by: INTERNAL MEDICINE

## 2021-08-11 RX ORDER — SERTRALINE HYDROCHLORIDE 25 MG/1
TABLET, FILM COATED ORAL
COMMUNITY
Start: 2021-07-23 | End: 2022-06-09

## 2021-08-11 RX ORDER — SUCRALFATE 1 G/1
1 TABLET ORAL
Qty: 360 TABLET | Refills: 3 | Status: SHIPPED | OUTPATIENT
Start: 2021-08-11 | End: 2022-06-09

## 2021-08-11 NOTE — PROGRESS NOTES
"Freida Aguilar is a 26 year old female who is being evaluated via a billable video visit.      The patient has been notified of following:     \"This video visit will be conducted via a call between you and your physician/provider. We have found that certain health care needs can be provided without the need for an in-person physical exam.  This service lets us provide the care you need with a video conversation.  If a prescription is necessary we can send it directly to your pharmacy.  If lab work is needed we can place an order for that and you can then stop by our lab to have the test done at a later time.    If during the course of the call the physician/provider feels a video visit is not appropriate, you will not be charged for this service.\"     Patient confirmed that they are in [place] for today's visit yes.    Video-Visit Details  Type of service:  Video Visit    Video Start Time: 1:09  Video End Time:  1:49    Originating Location (pt. Location): [place]    Distant Location (provider location):  [facility] [place]     Platform used: [employer]      GASTROENTEROLOGY Video Follow up visit    CC/REFERRING MD:    Daly Johnson    HISTORY OF PRESENT ILLNESS:    Freida Aguilar is a 26 year old female who is being evaluated via a billable video visit.      Last GI clinic appt 4/21/21.    Review of symptoms:  Daily nausea/discomfort, thought to be due to anxiety, working with a therapist.   Started Zoloft x 3 weeks ago    levsin is helpful for cramping, sometimes does not need it for a week, sometimes takes it a few times a day.    About 2-3 weeks ago had a flare of symptoms:  Intestinal unrest / liquid diarrhea gas and bloating , decreased appetite, nausea - this lasted 5 days and about a week after that still felt uncomfortable.    These episodes occur about 4x/year.    In between episodes: overall she is constipated.  Fiber gummies help but lead to gas and " bloating.    Social/  Due to night schedule, wakes up without appetite, eats 2 meals a day.      May Thurner syndrome:  States at initial dx was allergic to rivaroxaban  Switched to warfarin 6-12 months  Allergic to plavix  Switched to baby ASA  Recently saw IR, recommendation after review of updated imaging and symptoms was to discontinue ASA    I have reviewed and updated the patient's Past Medical History, Social History, Family History and Medication List. w    ALLERGIES  Clopidogrel and Rivaroxaban    PE/  Gen: pleasant NAD  HEENT: hearing intact  Psych: AOx3, normal mood and affect    PERTINENT STUDIES have been reviewed.      Impression/Recommendations:  Freida Aguilar is a 26 year old female who presents for evaluation of gas, nausea, and acid reflux.  She also has a long standing hx of constipation as well.  ddx includes celiac vs SIBO vs PUD vs IBS vs other.  -- labs - complete. 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  Patient has tried before.  We discussed reasons for retrial today and patient is amenable.  -- empiric rx for rifaximin TID x 10 days for empiric rx SIBO if patient has another flare of symptoms  -- we discussed lifestyle changes during episodes: gentle diet, get more sleep, rest, symptomatic options for meds  -- egd and colonoscopy - streaks of blood in stool likely related to fissure or hemorrhoids however colonoscopy for complete evaluation is warranted  -- conservative measures for now regarding constipation: 25g fiber/day, drinkable yogurt, squatty potty, fruit at bedtime or after dinner  RTC 3 mo       Appointment duration: 40 minutes      Thank you for this consultation.  It was a pleasure to participate in the care of this patient; please contact us with any further questions.

## 2021-08-11 NOTE — NURSING NOTE
Chief Complaint   Patient presents with     Follow Up       Vitals:    08/11/21 1127   Weight: 72.6 kg (160 lb)       Body mass index is 28.35 kg/m .    Octavia Corbett CMA

## 2021-08-11 NOTE — LETTER
8/11/2021         RE: Freida Aguilar  [address]  [place] [address]        Dear Colleague,    Thank you for referring your patient, Freida Aguilar, to the [facility] GASTROENTEROLOGY CLINIC [place]. Please see a copy of my visit note below.      GASTROENTEROLOGY Video Follow up visit    CC/REFERRING MD:    Daly Johnson    HISTORY OF PRESENT ILLNESS:    Freida Aguilar is a 26 year old female who is being evaluated via a billable video visit.      Last GI clinic appt 4/21/21.    Review of symptoms:  Daily nausea/discomfort, thought to be due to anxiety, working with a therapist.   Started Zoloft x 3 weeks ago    levsin is helpful for cramping, sometimes does not need it for a week, sometimes takes it a few times a day.    About 2-3 weeks ago had a flare of symptoms:  Intestinal unrest / liquid diarrhea gas and bloating , decreased appetite, nausea - this lasted 5 days and about a week after that still felt uncomfortable.    These episodes occur about 4x/year.    In between episodes: overall she is constipated.  Fiber gummies help but lead to gas and bloating.    Social/  Due to night schedule, wakes up without appetite, eats 2 meals a day.      May Thurner syndrome:  States at initial dx was allergic to rivaroxaban  Switched to warfarin 6-12 months  Allergic to plavix  Switched to baby ASA  Recently saw IR, recommendation after review of updated imaging and symptoms was to discontinue ASA    I have reviewed and updated the patient's Past Medical History, Social History, Family History and Medication List. w    ALLERGIES  Clopidogrel and Rivaroxaban    PE/  Gen: pleasant NAD  HEENT: hearing intact  Psych: AOx3, normal mood and affect    PERTINENT STUDIES have been reviewed.      Impression/Recommendations:  Freida Aguilar is a 26 year old female who presents for evaluation of gas, nausea, and acid reflux.  She also has a long standing hx of constipation as well.  ddx  includes celiac vs SIBO vs PUD vs IBS vs other.  -- labs - complete. 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  Patient has tried before.  We discussed reasons for retrial today and patient is amenable.  -- empiric rx for rifaximin TID x 10 days for empiric rx SIBO if patient has another flare of symptoms  -- we discussed lifestyle changes during episodes: gentle diet, get more sleep, rest, symptomatic options for meds  -- egd and colonoscopy - streaks of blood in stool likely related to fissure or hemorrhoids however colonoscopy for complete evaluation is warranted  -- conservative measures for now regarding constipation: 25g fiber/day, drinkable yogurt, squatty potty, fruit at bedtime or after dinner  RTC 3 mo       Appointment duration: 40 minutes      Thank you for this consultation.  It was a pleasure to participate in the care of this patient; please contact us with any further questions.            Again, thank you for allowing me to participate in the care of your patient.      Sincerely,    Dionne Soliz MD

## 2021-08-12 ENCOUNTER — PATIENT OUTREACH (OUTPATIENT)
Dept: GASTROENTEROLOGY | Facility: CLINIC | Age: 26
End: 2021-08-12

## 2021-08-12 NOTE — PROGRESS NOTES
Call placed to Freida to review the recommendations from the clinic visit from 8-.  Phone number provided to schedule the colonoscopy and EGD, 3 month follow up appointment scheduled on 11- and she will  the scripts from the pharmacy.

## 2021-08-18 ENCOUNTER — TELEPHONE (OUTPATIENT)
Dept: GASTROENTEROLOGY | Facility: CLINIC | Age: 26
End: 2021-08-18

## 2021-08-18 NOTE — TELEPHONE ENCOUNTER
Screening Questions  1. Are you active on mychart? YES     2. What insurance is in the chart?  BCBS    2.  Ordering/Referring Provider: Dionne Soliz MD in [facility] GASTROENTEROLOGY    3. BMI 30.02    4. Are you on daily home oxygen? NO     5. Do you have a history of difficult airway? NO     6. Have you had a heart, lung, or liver transplant? NO     7. Are you currently on dialysis? NO     8. Have you had a stroke or Transient ischemic atttack (TIA) within 6 months? No     9. In the past 6 months, have you had any heart related issues including cardiomyopathy or heart attack?         If yes, did it require cardiac stenting or other implantable device?no     10. Do you have any implantable devices in your body (pacemaker, defib, LVAD)? no    11. Do you take nitroglycerin? If yes, how often? No     12. Are you currently taking any blood thinners? no    13. Are you a diabetic? No     14. (Females) Are you currently pregnant? No   If yes, how many weeks?    15. Have you had a procedure in the past that was difficult to tolerate with conscious sedation? Any allergies to Fentanyl or Versed no     16. Are you taking any scheduled prescription narcotics more than once daily? No     17. Do you have any chemical dependencies such as alcohol, street drugs, or methadone? No     18. Do you have any history of post-traumatic stress syndrome or mental health issues? No     19. Do you transfer independently? Yes     20.  Do you have any issues with constipation?  Yes     21. Preferred Pharmacy for Pre Prescription [facility] (on file)     Scheduling Details    Which Colonoscopy Prep was Sent?:  Ext. Prep -MyChart  Procedure Scheduled: Colonoscopy & EGD   Provider/Surgeon: RC Soliz   Date of Procedure: 09/23/2021  Location: [facility]   Caller (Please ask for phone number if not scheduled by patient): PATIENT       Sedation Type: C.SEDATION   Conscious Sedation- Needs  for 6 hours after the procedure  MAC/General-Needs   for 24 hours after procedure    Pre-op Required at [facility] [place] and OR for MAC sedation:   (if yes advise patient they will need a pre-op prior to procedure)      Is patient on blood thinners? -NO  (If yes- inform patient to follow up with PCP or provider for follow up instructions)     Informed patient they will need an adult  YES   Cannot take any type of public or medical transportation alone    Informed Patient of COVID Test Requirement YES-SCHEDULED     Confirmed Nurse will call to complete assessment YES     Additional comments:

## 2021-08-25 DIAGNOSIS — Z11.59 ENCOUNTER FOR SCREENING FOR OTHER VIRAL DISEASES: ICD-10-CM

## 2021-09-16 ENCOUNTER — TELEPHONE (OUTPATIENT)
Dept: GASTROENTEROLOGY | Facility: CLINIC | Age: 26
End: 2021-09-16

## 2021-09-16 NOTE — TELEPHONE ENCOUNTER
Attempted to contact patient regarding upcoming colonoscopy/upper endoscopy procedure on 9-23 for pre assessment questions. No answer.     Left message to return call to [phone] #2    Covid test scheduled: 9-20 [facility]    Arrival time: 1105    Facility location: [facility]    Sedation type: conscious    Bowel prep recommendation: Golytely extended prep due to reported constipation    Maren Peguero RN    Instructions, policy, conscious sedation plan reviewed. Instructed patient to have someone stay with them for 6 hours post exam.

## 2021-09-20 ENCOUNTER — LAB (OUTPATIENT)
Dept: LAB | Facility: CLINIC | Age: 26
End: 2021-09-20
Payer: COMMERCIAL

## 2021-09-20 DIAGNOSIS — Z11.59 ENCOUNTER FOR SCREENING FOR OTHER VIRAL DISEASES: ICD-10-CM

## 2021-09-20 PROCEDURE — U0005 INFEC AGEN DETEC AMPLI PROBE: HCPCS

## 2021-09-20 PROCEDURE — U0003 INFECTIOUS AGENT DETECTION BY NUCLEIC ACID (DNA OR RNA); SEVERE ACUTE RESPIRATORY SYNDROME CORONAVIRUS 2 (SARS-COV-2) (CORONAVIRUS DISEASE [COVID-19]), AMPLIFIED PROBE TECHNIQUE, MAKING USE OF HIGH THROUGHPUT TECHNOLOGIES AS DESCRIBED BY CMS-2020-01-R: HCPCS

## 2021-09-21 ENCOUNTER — TELEPHONE (OUTPATIENT)
Dept: GASTROENTEROLOGY | Facility: CLINIC | Age: 26
End: 2021-09-21

## 2021-09-21 DIAGNOSIS — R14.0 BLOATING: Primary | ICD-10-CM

## 2021-09-21 LAB — SARS-COV-2 RNA RESP QL NAA+PROBE: NEGATIVE

## 2021-09-21 RX ORDER — BISACODYL 5 MG
TABLET, DELAYED RELEASE (ENTERIC COATED) ORAL
Qty: 2 TABLET | Refills: 0 | Status: SHIPPED | OUTPATIENT
Start: 2021-09-21 | End: 2022-06-09

## 2021-09-21 NOTE — TELEPHONE ENCOUNTER
Staff message stating that bowel prep was not sent to pharmacy.     Extended prep sent to [id number] - [address] AT [facility].     Called and spoke to patient to notify of script being sent. Patient states that pre assessment and bowl prep instructions were completed with a nurse.     Patient has no further questions or concerns at time.     Nurys Lima RN

## 2021-09-23 ENCOUNTER — HOSPITAL ENCOUNTER (OUTPATIENT)
Facility: AMBULATORY SURGERY CENTER | Age: 26
Discharge: HOME | End: 2021-09-23
Attending: INTERNAL MEDICINE | Admitting: INTERNAL MEDICINE
Payer: COMMERCIAL

## 2021-09-23 ENCOUNTER — ANCILLARY PROCEDURE (OUTPATIENT)
Dept: ULTRASOUND IMAGING | Facility: CLINIC | Age: 26
End: 2021-09-23
Attending: INTERNAL MEDICINE
Payer: COMMERCIAL

## 2021-09-23 VITALS
TEMPERATURE: 99 F | RESPIRATION RATE: 20 BRPM | HEIGHT: 63 IN | HEART RATE: 103 BPM | BODY MASS INDEX: 28.35 KG/M2 | SYSTOLIC BLOOD PRESSURE: 111 MMHG | OXYGEN SATURATION: 97 % | DIASTOLIC BLOOD PRESSURE: 79 MMHG | WEIGHT: 160 LBS

## 2021-09-23 DIAGNOSIS — R11.0 NAUSEA: ICD-10-CM

## 2021-09-23 DIAGNOSIS — R10.13 ABDOMINAL PAIN, EPIGASTRIC: ICD-10-CM

## 2021-09-23 DIAGNOSIS — R11.0 NAUSEA: Primary | ICD-10-CM

## 2021-09-23 LAB
COLONOSCOPY: NORMAL
HCG UR QL: NEGATIVE
INTERNAL QC OK POCT: NORMAL
UPPER GI ENDOSCOPY: NORMAL

## 2021-09-23 PROCEDURE — 43239 EGD BIOPSY SINGLE/MULTIPLE: CPT

## 2021-09-23 PROCEDURE — 88305 TISSUE EXAM BY PATHOLOGIST: CPT | Mod: TC | Performed by: INTERNAL MEDICINE

## 2021-09-23 PROCEDURE — 76705 ECHO EXAM OF ABDOMEN: CPT | Mod: GC | Performed by: RADIOLOGY

## 2021-09-23 PROCEDURE — 88305 TISSUE EXAM BY PATHOLOGIST: CPT | Mod: 26 | Performed by: PATHOLOGY

## 2021-09-23 PROCEDURE — 81025 URINE PREGNANCY TEST: CPT | Performed by: PATHOLOGY

## 2021-09-23 PROCEDURE — 45378 DIAGNOSTIC COLONOSCOPY: CPT

## 2021-09-23 RX ORDER — NALOXONE HYDROCHLORIDE 0.4 MG/ML
0.4 INJECTION, SOLUTION INTRAMUSCULAR; INTRAVENOUS; SUBCUTANEOUS
Status: DISCONTINUED | OUTPATIENT
Start: 2021-09-23 | End: 2021-09-24 | Stop reason: HOSPADM

## 2021-09-23 RX ORDER — ONDANSETRON 4 MG/1
4 TABLET, ORALLY DISINTEGRATING ORAL EVERY 6 HOURS PRN
Status: DISCONTINUED | OUTPATIENT
Start: 2021-09-23 | End: 2021-09-24 | Stop reason: HOSPADM

## 2021-09-23 RX ORDER — NALOXONE HYDROCHLORIDE 0.4 MG/ML
0.2 INJECTION, SOLUTION INTRAMUSCULAR; INTRAVENOUS; SUBCUTANEOUS
Status: DISCONTINUED | OUTPATIENT
Start: 2021-09-23 | End: 2021-09-24 | Stop reason: HOSPADM

## 2021-09-23 RX ORDER — ONDANSETRON 2 MG/ML
4 INJECTION INTRAMUSCULAR; INTRAVENOUS
Status: DISCONTINUED | OUTPATIENT
Start: 2021-09-23 | End: 2021-09-23 | Stop reason: HOSPADM

## 2021-09-23 RX ORDER — LIDOCAINE 40 MG/G
CREAM TOPICAL
Status: DISCONTINUED | OUTPATIENT
Start: 2021-09-23 | End: 2021-09-23 | Stop reason: HOSPADM

## 2021-09-23 RX ORDER — FLUMAZENIL 0.1 MG/ML
0.2 INJECTION, SOLUTION INTRAVENOUS
Status: DISCONTINUED | OUTPATIENT
Start: 2021-09-23 | End: 2021-09-24 | Stop reason: HOSPADM

## 2021-09-23 RX ORDER — FENTANYL CITRATE 50 UG/ML
INJECTION, SOLUTION INTRAMUSCULAR; INTRAVENOUS PRN
Status: DISCONTINUED | OUTPATIENT
Start: 2021-09-23 | End: 2021-09-23 | Stop reason: HOSPADM

## 2021-09-23 RX ORDER — ONDANSETRON 2 MG/ML
4 INJECTION INTRAMUSCULAR; INTRAVENOUS EVERY 6 HOURS PRN
Status: DISCONTINUED | OUTPATIENT
Start: 2021-09-23 | End: 2021-09-24 | Stop reason: HOSPADM

## 2021-09-23 RX ORDER — SIMETHICONE
LIQUID (ML) MISCELLANEOUS PRN
Status: DISCONTINUED | OUTPATIENT
Start: 2021-09-23 | End: 2021-09-23 | Stop reason: HOSPADM

## 2021-09-23 RX ORDER — PROCHLORPERAZINE MALEATE 10 MG
10 TABLET ORAL EVERY 6 HOURS PRN
Status: DISCONTINUED | OUTPATIENT
Start: 2021-09-23 | End: 2021-09-24 | Stop reason: HOSPADM

## 2021-09-23 ASSESSMENT — MIFFLIN-ST. JEOR: SCORE: 1444.89

## 2021-09-24 LAB
PATH REPORT.COMMENTS IMP SPEC: NORMAL
PATH REPORT.COMMENTS IMP SPEC: NORMAL
PATH REPORT.FINAL DX SPEC: NORMAL
PATH REPORT.GROSS SPEC: NORMAL
PATH REPORT.MICROSCOPIC SPEC OTHER STN: NORMAL
PATH REPORT.RELEVANT HX SPEC: NORMAL
PHOTO IMAGE: NORMAL

## 2021-09-30 DIAGNOSIS — Z87.19 HX OF HEMORRHOIDS: Primary | ICD-10-CM

## 2021-10-05 ENCOUNTER — MYC MEDICAL ADVICE (OUTPATIENT)
Dept: GASTROENTEROLOGY | Facility: CLINIC | Age: 26
End: 2021-10-05

## 2021-11-17 ENCOUNTER — TELEPHONE (OUTPATIENT)
Dept: OBGYN | Facility: CLINIC | Age: 26
End: 2021-11-17
Payer: COMMERCIAL

## 2021-11-17 NOTE — TELEPHONE ENCOUNTER
Left message for patient to call back and reschedule appointment on 11/19 as provider will be out that day now.    Karma Pepe  Clinical Services Assistant

## 2021-12-14 NOTE — TELEPHONE ENCOUNTER
RECORDS RECEIVED FROM: Hemorrhoids   Appt date: 1/24/2022   NOTES STATUS DETAILS   OFFICE NOTE from referring provider  N/A    OFFICE NOTE from other specialist   Internal / Care Everywhere [facility]:  8/11/21 - GI OV with Dr. Martínez [facility]:   5/6/21, 3/2/21  - PCC OV with GABY Menjivar    [facility]:  2/14/20 - PCC OV with Julia Burgess NP    [facility]:  8/6/19 - PCC OV with Dr. Santiago  7/29/19 - SURG OV with Dr. Latif   DISCHARGE SUMMARY from hospital  N/A    DISCHARGE REPORT from the ER N/A    OPERATIVE REPORT  Care Everywhere [facility]:  8/29/19 - OP Note for ANORECTAL EXAM, HEMORRHOIDECTOMY AND BOTOX INJECTION with Dr. Latif   MEDICATION LIST Internal    LABS     ANAL PAP Internal [facility]:  ([facility]) 1/21/22 - Anal PAP   BIOPSIES/PATHOLOGY RELATED TO DIAGNOSIS N/A    DIAGNOSTIC PROCEDURES     PFC TESTING (from the Pelvic floor center includes Manometry, PDNL, EMG, etc.)    Note: May include imaging in the testing N/A    COLONOSCOPY Internal [facility]:  9/23/21 - Colonscopy   UPPER ENDOSCOPY (EGD) Internal [facility]:  9/23/21 - EGD   FLEX SIGMOIDOSCOPY  N/A    ERCP N/A    IMAGING (DISC & REPORT)      CT  N/A    MRI N/A    XRAY N/A    ULTRASOUND (ENDOANAL/ENDORECTAL) Internal [facility]:  9/23/21 - US Abdomen

## 2022-01-24 ENCOUNTER — PRE VISIT (OUTPATIENT)
Dept: SURGERY | Facility: CLINIC | Age: 27
End: 2022-01-24

## 2022-03-27 ENCOUNTER — HEALTH MAINTENANCE LETTER (OUTPATIENT)
Age: 27
End: 2022-03-27

## 2022-05-16 ENCOUNTER — TELEPHONE (OUTPATIENT)
Dept: FAMILY MEDICINE | Facility: CLINIC | Age: 27
End: 2022-05-16

## 2022-05-16 DIAGNOSIS — M54.9 UPPER BACK PAIN: Primary | ICD-10-CM

## 2022-05-18 NOTE — TELEPHONE ENCOUNTER
FUTURE VISIT INFORMATION      FUTURE VISIT INFORMATION:    Date: 6/28/22    Time: 11:00am    Location: [facility]  REFERRAL INFORMATION:    Referring provider:  Isac Conley MD    Referring providers clinic:  [facility]    Reason for visit/diagnosis  breast reduction    RECORDS REQUESTED FROM:       Clinic name Comments Records Status Imaging Status   [facility] Phone note/referral 5/16/22 epic

## 2022-06-09 ENCOUNTER — OFFICE VISIT (OUTPATIENT)
Dept: OBGYN | Facility: CLINIC | Age: 27
End: 2022-06-09
Payer: COMMERCIAL

## 2022-06-09 VITALS
BODY MASS INDEX: 31.18 KG/M2 | HEIGHT: 63 IN | OXYGEN SATURATION: 96 % | SYSTOLIC BLOOD PRESSURE: 119 MMHG | DIASTOLIC BLOOD PRESSURE: 87 MMHG | HEART RATE: 121 BPM | WEIGHT: 176 LBS

## 2022-06-09 DIAGNOSIS — Z01.419 WOMEN'S ANNUAL ROUTINE GYNECOLOGICAL EXAMINATION: Primary | ICD-10-CM

## 2022-06-09 LAB
ALBUMIN UR-MCNC: NEGATIVE MG/DL
APPEARANCE UR: CLEAR
BACTERIA #/AREA URNS HPF: ABNORMAL /HPF
BILIRUB UR QL STRIP: NEGATIVE
COLOR UR AUTO: YELLOW
GLUCOSE UR STRIP-MCNC: NEGATIVE MG/DL
HGB UR QL STRIP: ABNORMAL
KETONES UR STRIP-MCNC: NEGATIVE MG/DL
LEUKOCYTE ESTERASE UR QL STRIP: ABNORMAL
NITRATE UR QL: POSITIVE
PH UR STRIP: 6 [PH] (ref 5–7)
RBC #/AREA URNS AUTO: ABNORMAL /HPF
SP GR UR STRIP: 1.02 (ref 1–1.03)
SQUAMOUS #/AREA URNS AUTO: ABNORMAL /LPF
UROBILINOGEN UR STRIP-ACNC: 0.2 E.U./DL
WBC #/AREA URNS AUTO: ABNORMAL /HPF

## 2022-06-09 PROCEDURE — 87088 URINE BACTERIA CULTURE: CPT | Performed by: OBSTETRICS & GYNECOLOGY

## 2022-06-09 PROCEDURE — 87186 SC STD MICRODIL/AGAR DIL: CPT | Performed by: OBSTETRICS & GYNECOLOGY

## 2022-06-09 PROCEDURE — 87086 URINE CULTURE/COLONY COUNT: CPT | Performed by: OBSTETRICS & GYNECOLOGY

## 2022-06-09 PROCEDURE — 99385 PREV VISIT NEW AGE 18-39: CPT | Performed by: OBSTETRICS & GYNECOLOGY

## 2022-06-09 PROCEDURE — 81001 URINALYSIS AUTO W/SCOPE: CPT | Performed by: OBSTETRICS & GYNECOLOGY

## 2022-06-09 PROCEDURE — G0145 SCR C/V CYTO,THINLAYER,RESCR: HCPCS | Performed by: OBSTETRICS & GYNECOLOGY

## 2022-06-09 RX ORDER — SPIRONOLACTONE 50 MG/1
TABLET, FILM COATED ORAL
COMMUNITY
Start: 2022-04-25 | End: 2022-09-27

## 2022-06-09 RX ORDER — PROPRANOLOL HYDROCHLORIDE 10 MG/1
TABLET ORAL
COMMUNITY
Start: 2021-07-13 | End: 2024-08-14

## 2022-06-09 RX ORDER — DULOXETINE 40 MG/1
CAPSULE, DELAYED RELEASE ORAL
COMMUNITY
Start: 2021-11-06 | End: 2024-08-01

## 2022-06-09 NOTE — PROGRESS NOTES
Freida is a 26 year old No obstetric history on file. female who presents for annual exam.     Menses are regular q 28-30 days and normal lasting 4 days.  Menses flow: normal.  Patient's last menstrual period was 2022.. Using none for contraception.  She is not currently considering pregnancy.  Besides routine health maintenance, she has no other health concerns today .  GYNECOLOGIC HISTORY:  Menarche:   Freida is not sexually active   History sexually transmitted infections:No STD history  STI testing offered?  N/A, Never sexually active  TIA exposure: Unknown  History of abnormal Pap smear: NO - age 21-29 PAP every 3 years recommended  Family history of breast CA: No  Family history of uterine/ovarian CA: No    Family history of colon CA: No    HEALTH MAINTENANCE:  Cholesterol: (No results found for: CHOL History of abnormal lipids: No  Mammo: na . History of abnormal Mammo: No.  Regular Self Breast Exams: Yes  Calcium/Vitamin D intake: source:  Vit d Adequate? Yes  TSH: (  TSH   Date Value Ref Range Status   2021 3.16 0.40 - 4.00 mU/L Final    )  Pap; (No results found for: PAP )    HISTORY:  OB History    Para Term  AB Living   0 0 0 0 0 0   SAB IAB Ectopic Multiple Live Births   0 0 0 0 0     Past Medical History:   Diagnosis Date     Acne      Acute deep vein thrombosis (DVT) of lower extremity (H)     while on OCPs     Past Surgical History:   Procedure Laterality Date     COLONOSCOPY N/A 2021    Procedure: COLONOSCOPY;  Surgeon: Dionne Soliz MD;  Location[place] [facility] [place]     ESOPHAGOSCOPY, GASTROSCOPY, DUODENOSCOPY (EGD), COMBINED N/A 2021    Procedure: ESOPHAGOGASTRODUODENOSCOPY, WITH BIOPSY;  Surgeon: Dionne Soliz MD;  Location: [facility] [place]     HEMORRHOID SURGERY       IR STENT VASCULAR LT Left      WISDOM TOOTH EXTRACTION Bilateral      Family History   Problem Relation Age of Onset     Kidney Cancer Maternal Grandfather      Lung Cancer Paternal Grandfather      Breast  Cancer No family hx of      Clotting Disorder No family hx of      Colon Cancer No family hx of      Social History     Socioeconomic History     Marital status: Single   Tobacco Use     Smoking status: Never Smoker     Smokeless tobacco: Never Used   Substance and Sexual Activity     Alcohol use: Not Currently     Drug use: Not Currently     Sexual activity: Never     Partners: Male       Current Outpatient Medications:      DULoxetine HCl 40 MG CPEP, , Disp: , Rfl:      spironolactone (ALDACTONE) 50 MG tablet, , Disp: , Rfl:      hyoscyamine SL (LEVSIN/SL) 0.125 MG sublingual tablet, Take 1 tablet (0.125 mg) by mouth 4 times daily as needed (abdominal cramping), Disp: 60 tablet, Rfl: 3     multivitamin w/minerals (MULTI-VITAMIN) tablet, , Disp: , Rfl:      propranolol (INDERAL) 10 MG tablet, TAKE 1 TO 2 TABLETS BY MOUTH TWICE DAILY AS NEEDED FOR ANXIETY, Disp: , Rfl:      Vitamin D3 (CHOLECALCIFEROL) 25 mcg (1000 units) tablet, , Disp: , Rfl:      Allergies   Allergen Reactions     Clopidogrel Hives, Itching and Swelling     Rivaroxaban Hives and Itching       Past medical, surgical, social and family history were reviewed and updated in EPIC.    ROS:   C:     NEGATIVE for fever, chills, change in weight  I:       NEGATIVE for worrisome rashes, moles or lesions  E:     NEGATIVE for vision changes or irritation  E/M: NEGATIVE for ear, mouth and throat problems  R:     NEGATIVE for significant cough or SOB  CV:   NEGATIVE for chest pain, palpitations or peripheral edema  GI:     NEGATIVE for nausea, abdominal pain, heartburn, or change in bowel habits  :   NEGATIVE for frequency, dysuria, hematuria, vaginal discharge, or irregular bleeding  M:     NEGATIVE for significant arthralgias or myalgia  N:      NEGATIVE for weakness, dizziness or paresthesias  E:      NEGATIVE for temperature intolerance, skin/hair changes  P:      NEGATIVE for changes in mood or affect.    EXAM:  /87   Pulse (!) 121   Ht 1.6 m  "(5' 3\")   Wt 79.8 kg (176 lb)   LMP 05/27/2022   SpO2 96%   BMI 31.18 kg/m     BMI: Body mass index is 31.18 kg/m .  Constitutional: healthy, alert and no distress  Head: Normocephalic. No masses, lesions, tenderness or abnormalities  Neck: Neck supple. Trachea midline. No adenopathy. Thyroid symmetric, normal size.   Cardiovascular: RRR.   Respiratory: Negative.   Breast: No nodularity, asymmetry or nipple discharge bilaterally.  Gastrointestinal: Abdomen soft, non-tender, non-distended. No masses, organomegaly.  :  Vulva:  No external lesions, normal female hair distribution, no inguinal adenopathy.    Urethra:  Midline, non-tender, well supported, no discharge  Vagina:  Moist, pink, no abnormal discharge, no lesions  Uterus:  Normal size, mid position , non-tender, freely mobile  Ovaries:  No masses appreciated, non-tender, mobile  Rectal Exam: deferred  Musculoskeletal: extremities normal  Skin: no suspicious lesions or rashes  Psychiatric: Affect appropriate, cooperative,mentation appears normal.     COUNSELING:   Reviewed preventive health counseling, as reflected in patient instructions       Family planning       Safe sex practices/STD prevention   reports that she has never smoked. She has never used smokeless tobacco.    Body mass index is 31.18 kg/m .  Weight management plan: not discussed today   FRAX Risk Assessment    ASSESSMENT:  26 year old female who presents for annual GYN exam   -Normal clinical breast and pelvic exam   -Pap smear obtained, HPV vaccines UTD   -Patient declines STI screening   -Reviewed history of DVT while on OCPs and discussed safe options for contraception if needed in the future    Dispo: RTC in one year or sooner CHAZ Hernandez MD    "

## 2022-06-10 ENCOUNTER — TELEPHONE (OUTPATIENT)
Dept: OBGYN | Facility: CLINIC | Age: 27
End: 2022-06-10
Payer: COMMERCIAL

## 2022-06-10 DIAGNOSIS — R30.0 DYSURIA: Primary | ICD-10-CM

## 2022-06-10 RX ORDER — NITROFURANTOIN 25; 75 MG/1; MG/1
100 CAPSULE ORAL 2 TIMES DAILY
Qty: 10 CAPSULE | Refills: 0 | Status: CANCELLED | OUTPATIENT
Start: 2022-06-10 | End: 2022-06-15

## 2022-06-10 RX ORDER — NITROFURANTOIN 25; 75 MG/1; MG/1
100 CAPSULE ORAL 2 TIMES DAILY
Qty: 10 CAPSULE | Refills: 0 | Status: SHIPPED | OUTPATIENT
Start: 2022-06-10 | End: 2022-06-15

## 2022-06-10 NOTE — TELEPHONE ENCOUNTER
Leah Hernandez MD   6/9/2022  5:23 PM CDT         Please call patient and let her know that her UA was consistent with a UTI and I would recommend starting antibiotics while waiting for the final urine culture. Please send in a Rx for Macrobid 100 mg BID x 5 days and let her know that we will follow up on the final result of the culture and may recommend changing/stopping the antibiotics based on the final result.      Leah Hernandez MD     Patient returned call and was informed of Leah Hernandez MD comments and recommendations. Patient reports understanding and appreciation and agrees with plan.   Patient denied any questions at this time.   Dee HAYNES RN

## 2022-06-11 LAB — BACTERIA UR CULT: ABNORMAL

## 2022-06-13 LAB
BKR LAB AP GYN ADEQUACY: NORMAL
BKR LAB AP GYN INTERPRETATION: NORMAL
BKR LAB AP HPV REFLEX: NO
BKR LAB AP PREVIOUS ABNORMAL: NORMAL
PATH REPORT.COMMENTS IMP SPEC: NORMAL
PATH REPORT.COMMENTS IMP SPEC: NORMAL
PATH REPORT.RELEVANT HX SPEC: NORMAL

## 2022-06-20 ENCOUNTER — MYC MEDICAL ADVICE (OUTPATIENT)
Dept: OBGYN | Facility: CLINIC | Age: 27
End: 2022-06-20
Payer: COMMERCIAL

## 2022-06-22 ENCOUNTER — E-VISIT (OUTPATIENT)
Dept: OBGYN | Facility: CLINIC | Age: 27
End: 2022-06-22
Payer: COMMERCIAL

## 2022-06-22 DIAGNOSIS — R30.0 DIFFICULT OR PAINFUL URINATION: ICD-10-CM

## 2022-06-22 DIAGNOSIS — R30.0 DYSURIA: Primary | ICD-10-CM

## 2022-06-22 PROCEDURE — 99207 PR NON-BILLABLE SERV PER CHARTING: CPT | Performed by: OBSTETRICS & GYNECOLOGY

## 2022-06-23 NOTE — PROGRESS NOTES
PLASTIC SURGERY HISTORY AND PHYSICAL    Chief Complaint:Hypertrophy of breasts   Referring Provider: Isac Conley      HPI: Reva  is a 27 year old female who presents with symptomatic macromastia. She currently wears a G, would prefer to be a D. She complains of back pain, shoulder pain, neck pain for 8 years. Feels that large breasts make it difficult to exercise. Also feels that they affect her at night, hard to get comfortable because of the large breasts. She has tried the following without successful resolution of pain symptoms:  Chiropractor who also recommended home daily exercises, pain medication including tylenol/ibuprofen, buying specialty bras. No family history of breast cancer.    Recent mammogram: no  Rashes: yes. Intermittent for 6 years. No topicals.     DVT/PE-on OCP at the time. May 2016. Was in ICU for procedure to remove clot from leg and place stent. Has a history of May Thurner. Took clopidigrel and rivaroxiban but allergic to both, ended up on warfarin for 6 months.    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    PSH:   Past Surgical History:   Procedure Laterality Date     COLONOSCOPY N/A 09/23/2021     ESOPHAGOSCOPY, GASTROSCOPY, DUODENOSCOPY (EGD), COMBINED N/A 09/23/2021     HEMORRHOID SURGERY       IR STENT VASCULAR LT Left      WISDOM TOOTH EXTRACTION Bilateral        FH:   Family History   Problem Relation Age of Onset     Kidney Cancer Maternal Grandfather      Lung Cancer Paternal Grandfather      Breast Cancer No family hx of      Clotting Disorder No family hx of      Colon Cancer No family hx of         SH:   Social History     Tobacco Use     Smoking status: Never Smoker     Smokeless tobacco: Never Used   Substance Use Topics     Alcohol use: Not Currently     Drug use: Not Currently      Work/school: .    MEDS:     Current Outpatient  "Medications:      DULoxetine HCl 40 MG CPEP, , Disp: , Rfl:      hyoscyamine SL (LEVSIN/SL) 0.125 MG sublingual tablet, Take 1 tablet (0.125 mg) by mouth 4 times daily as needed (abdominal cramping), Disp: 60 tablet, Rfl: 3     multivitamin w/minerals (THERA-VIT-M) tablet, , Disp: , Rfl:      propranolol (INDERAL) 10 MG tablet, TAKE 1 TO 2 TABLETS BY MOUTH TWICE DAILY AS NEEDED FOR ANXIETY, Disp: , Rfl:      spironolactone (ALDACTONE) 50 MG tablet, , Disp: , Rfl:      Vitamin D3 (CHOLECALCIFEROL) 25 mcg (1000 units) tablet, , Disp: , Rfl:        ALLERGIES:     Allergies   Allergen Reactions     Clopidogrel Hives, Itching and Swelling     Rivaroxaban Hives and Itching   hives and swelling to both     ROS: + DVT/PE. Denies chest pain, shortness of breath, MI, CVA,  and bleeding disorders.     PHYSICAL EXAMINATION:   /83   Pulse 103   Ht 1.6 m (5' 3\")   Wt 79.3 kg (174 lb 12.8 oz)   LMP 05/27/2022   SpO2 100%   BMI 30.96 kg/m     BMI: Body mass index is 30.96 kg/m .  Body surface area is 1.88 meters squared.   General: NAD  Chest: bilateral macromastia with grade III ptosis.   +marks on shoulders from bra    500g     ASSESSMENT: 28 yo F PMH May Thurner syndrome with DVT/PE in 2016 s/p stent placement, presents with symptomatic macromastia desiring breast reduction.       PLAN:     I believe she is a good candidate for a breast reduction.According to Schnur scale 500g will need to be removed, this will be approximately 60% reduction from her current size. She understands that this amount will need to be removed to be covered by insurance. Risks and benefits of the procedure were discussed, including but not limited to bleeding, infection, scarring, wound healing complications, asymmetry, loss of nipple sensation, nipple necrosis, inability to breast feed and change in size of breasts in the future. She understands the risks and would like to proceed with surgery.    She will need an anticoagulation plan from " her PCP prior to surgery. She understands and will get this done. Chart review after patient left today reveals a visit to 6/2/2021 with vascular prior to GI procedure, no anticoagulation was needed at that time.     I will place orders today.     Marj Dunn PA-C  Plastic and Reconstructive Surgery     45 minutes spent on the date of the encounter doing chart review, history and physical, documentation and further activity as noted above.

## 2022-06-24 NOTE — PATIENT INSTRUCTIONS
Dear Freida Aguilar,     After reviewing your responses, I would like you to come in for a urine test to make sure we treat you correctly. This urine test is to evaluate you for a possible urinary tract infection, and should be scheduled for today or tomorrow. Schedule a Lab Only appointment here.     Lab appointments are not available at most locations on the weekends. If no Lab Only appointment is available, you should be seen in any of our convenient Walk-in or Urgent Care Centers, which can be found on our website here.     You will receive instructions with your results in [facility] once they are available.     If your symptoms worsen, you develop pain in your back or stomach, develop fevers, or are not improving in 5 days, please contact your primary care provider for an appointment or visit a Walk-in or Urgent Care Center to be seen.     Thanks again for choosing us as your health care partner,     Leah Hernandez MD

## 2022-06-25 ENCOUNTER — LAB (OUTPATIENT)
Dept: LAB | Facility: CLINIC | Age: 27
End: 2022-06-25
Payer: COMMERCIAL

## 2022-06-25 DIAGNOSIS — R30.0 DYSURIA: ICD-10-CM

## 2022-06-25 LAB
ALBUMIN UR-MCNC: NEGATIVE MG/DL
APPEARANCE UR: CLEAR
BACTERIA #/AREA URNS HPF: ABNORMAL /HPF
BILIRUB UR QL STRIP: NEGATIVE
COLOR UR AUTO: YELLOW
GLUCOSE UR STRIP-MCNC: NEGATIVE MG/DL
HGB UR QL STRIP: NEGATIVE
KETONES UR STRIP-MCNC: NEGATIVE MG/DL
LEUKOCYTE ESTERASE UR QL STRIP: NEGATIVE
NITRATE UR QL: NEGATIVE
PH UR STRIP: 6.5 [PH] (ref 5–7)
RBC #/AREA URNS AUTO: ABNORMAL /HPF
SP GR UR STRIP: 1.01 (ref 1–1.03)
SQUAMOUS #/AREA URNS AUTO: ABNORMAL /LPF
UROBILINOGEN UR STRIP-ACNC: 0.2 E.U./DL
WBC #/AREA URNS AUTO: ABNORMAL /HPF

## 2022-06-25 PROCEDURE — 87086 URINE CULTURE/COLONY COUNT: CPT

## 2022-06-25 PROCEDURE — 81001 URINALYSIS AUTO W/SCOPE: CPT

## 2022-06-28 ENCOUNTER — PRE VISIT (OUTPATIENT)
Dept: PLASTIC SURGERY | Facility: CLINIC | Age: 27
End: 2022-06-28
Payer: COMMERCIAL

## 2022-06-28 ENCOUNTER — OFFICE VISIT (OUTPATIENT)
Dept: PLASTIC SURGERY | Facility: CLINIC | Age: 27
End: 2022-06-28
Attending: FAMILY MEDICINE
Payer: COMMERCIAL

## 2022-06-28 VITALS
SYSTOLIC BLOOD PRESSURE: 129 MMHG | WEIGHT: 174.8 LBS | OXYGEN SATURATION: 100 % | BODY MASS INDEX: 30.97 KG/M2 | DIASTOLIC BLOOD PRESSURE: 83 MMHG | HEIGHT: 63 IN | HEART RATE: 103 BPM

## 2022-06-28 DIAGNOSIS — N62 MACROMASTIA: Primary | ICD-10-CM

## 2022-06-28 LAB — BACTERIA UR CULT: NO GROWTH

## 2022-06-28 PROCEDURE — 99204 OFFICE O/P NEW MOD 45 MIN: CPT | Performed by: PHYSICIAN ASSISTANT

## 2022-06-28 RX ORDER — CEFAZOLIN SODIUM 2 G/50ML
2 SOLUTION INTRAVENOUS SEE ADMIN INSTRUCTIONS
Status: CANCELLED | OUTPATIENT
Start: 2022-06-28

## 2022-06-28 RX ORDER — CEFAZOLIN SODIUM 2 G/50ML
2 SOLUTION INTRAVENOUS
Status: CANCELLED | OUTPATIENT
Start: 2022-06-28

## 2022-06-28 ASSESSMENT — PAIN SCALES - GENERAL: PAINLEVEL: NO PAIN (0)

## 2022-06-28 NOTE — NURSING NOTE
"Chief Complaint   Patient presents with     Consult     Breast reduction consult        Vitals:    06/28/22 1111   BP: 129/83   Pulse: 103   SpO2: 100%   Weight: 79.3 kg (174 lb 12.8 oz)   Height: 1.6 m (5' 3\")       Body mass index is 30.96 kg/m .          MARY JANE MARCUS EMT    "

## 2022-07-06 ENCOUNTER — OFFICE VISIT (OUTPATIENT)
Dept: FAMILY MEDICINE | Facility: CLINIC | Age: 27
End: 2022-07-06
Payer: COMMERCIAL

## 2022-07-06 ENCOUNTER — TELEPHONE (OUTPATIENT)
Dept: OTHER | Facility: CLINIC | Age: 27
End: 2022-07-06

## 2022-07-06 VITALS
HEIGHT: 64 IN | BODY MASS INDEX: 30.15 KG/M2 | WEIGHT: 176.6 LBS | TEMPERATURE: 99.3 F | HEART RATE: 109 BPM | RESPIRATION RATE: 16 BRPM | OXYGEN SATURATION: 97 % | SYSTOLIC BLOOD PRESSURE: 120 MMHG | DIASTOLIC BLOOD PRESSURE: 78 MMHG

## 2022-07-06 DIAGNOSIS — Z86.711 HISTORY OF PULMONARY EMBOLISM: ICD-10-CM

## 2022-07-06 DIAGNOSIS — I87.1 MAY-THURNER SYNDROME: Primary | ICD-10-CM

## 2022-07-06 PROCEDURE — 99213 OFFICE O/P EST LOW 20 MIN: CPT | Performed by: PHYSICIAN ASSISTANT

## 2022-07-06 ASSESSMENT — PAIN SCALES - GENERAL: PAINLEVEL: NO PAIN (0)

## 2022-07-06 ASSESSMENT — PATIENT HEALTH QUESTIONNAIRE - PHQ9
SUM OF ALL RESPONSES TO PHQ QUESTIONS 1-9: 1
SUM OF ALL RESPONSES TO PHQ QUESTIONS 1-9: 1
10. IF YOU CHECKED OFF ANY PROBLEMS, HOW DIFFICULT HAVE THESE PROBLEMS MADE IT FOR YOU TO DO YOUR WORK, TAKE CARE OF THINGS AT HOME, OR GET ALONG WITH OTHER PEOPLE: NOT DIFFICULT AT ALL

## 2022-07-06 NOTE — PROGRESS NOTES
[address], [address]  [facility] [address]  Phone: [phone]  Primary Provider: Isac Conley  {FV AMB Performing Provider (Optional):738484}    {Provider  Link to PREOP SmartSet  Use this to apply standard patient instructions to AVS; includes medication directions, common orders, guidelines for anemia, warfarin, additional testing   :418666}  PREOPERATIVE EVALUATION:  Today's date: 7/6/2022    Freida Aguilar is a 27 year old female who presents for a preoperative evaluation.    Surgical Information:  Surgery/Procedure: Bilateral breast reduction  Surgery Location: [facility]  Surgeon: ***  Surgery Date: ***  Time of Surgery: ***  Where patient plans to recover: {Preop post recovery plans :237061}  Fax number for surgical facility: {SURGERY FAX NUMBER:[id number]}    Type of Anesthesia Anticipated: {ANESTHESIA:819540}    {2021 Provider Charting Preference for Preop :201317}    Subjective     HPI related to upcoming procedure: ***    Preop Questions 7/6/2022   1. Have you ever had a heart attack or stroke? No   2. Have you ever had surgery on your heart or blood vessels, such as a stent placement, a coronary artery bypass, or surgery on an artery in your head, neck, heart, or legs? YES - ***   3. Do you have chest pain with activity? No   4. Do you have a history of  heart failure? No   5. Do you currently have a cold, bronchitis or symptoms of other infection? No   6. Do you have a cough, shortness of breath, or wheezing? No   7. Do you or anyone in your family have previous history of blood clots? YES - ***   8. Do you or does anyone in your family have a serious bleeding problem such as prolonged bleeding following surgeries or cuts? No   9. Have you ever had problems with anemia or been told to take iron pills? No   10. Have you had any abnormal blood loss such as black, tarry or bloody stools, or abnormal vaginal bleeding? No   11. Have you ever had a blood  transfusion? No   12. Are you willing to have a blood transfusion if it is medically needed before, during, or after your surgery? Yes   13. Have you or any of your relatives ever had problems with anesthesia? No   14. Do you have sleep apnea, excessive snoring or daytime drowsiness? No   15. Do you have any artifical heart valves or other implanted medical devices like a pacemaker, defibrillator, or continuous glucose monitor? No   16. Do you have artificial joints? No   17. Are you allergic to latex? No   18. Is there any chance that you may be pregnant? No       Health Care Directive:  Patient does not have a Health Care Directive or Living Will: {ADVANCE_DIRECTIVE_STATUS:892652}    Preoperative Review of :  {Mnpmpreport:285334}  {Review MNPMP for all patients per ICSI MNPMP Profile:662894}    {Chronic problem details (Optional) :668359}    Review of Systems  {ROS Preop Choices:624870}    Patient Active Problem List    Diagnosis Date Noted     May-Thurner syndrome 03/06/2021     Priority: Medium     Diagnosed after large DVT and bilateral diffuse PE precipitated by estrogen containing OCP. Stent placed       History of pulmonary embolism 03/06/2021     Priority: Medium     Major depression 03/06/2021     Priority: Medium     Abdominal pain, generalized 03/06/2021     Priority: Medium     Long standing history.  Unclear etiology, has had some previous work up--records not available.       Vitamin D deficiency 03/06/2021     Priority: Medium     Chronic constipation 03/06/2021     Priority: Medium     Nausea 03/06/2021     Priority: Medium     Abdominal pain, epigastric 03/02/2021     Priority: Medium     Family history of hypothyroidism 02/14/2020     Priority: Medium     Gastroesophageal reflux disease 02/14/2020     Priority: Medium     Hx of hemorrhoids 02/14/2020     Priority: Medium     hemorrhoidectomy       History of DVT (deep vein thrombosis) 05/26/2016     Priority: Medium      Past Medical History:  "  Diagnosis Date     Acne      Acute deep vein thrombosis (DVT) of lower extremity (H)     while on OCPs     Past Surgical History:   Procedure Laterality Date     COLONOSCOPY N/A 2021    Procedure: COLONOSCOPY;  Surgeon: Dionne Soliz MD;  Location: [facility] [place]     ESOPHAGOSCOPY, GASTROSCOPY, DUODENOSCOPY (EGD), COMBINED N/A 2021    Procedure: ESOPHAGOGASTRODUODENOSCOPY, WITH BIOPSY;  Surgeon: Dionne Soliz MD;  Location: [facility] [place] [facility] OR     HEMORRHOID SURGERY       IR STENT VASCULAR LT Left      WISDOM TOOTH EXTRACTION Bilateral      Current Outpatient Medications   Medication Sig Dispense Refill     DULoxetine HCl 40 MG CPEP        hyoscyamine SL (LEVSIN/SL) 0.125 MG sublingual tablet Take 1 tablet (0.125 mg) by mouth 4 times daily as needed (abdominal cramping) 60 tablet 3     multivitamin w/minerals (THERA-VIT-M) tablet        propranolol (INDERAL) 10 MG tablet TAKE 1 TO 2 TABLETS BY MOUTH TWICE DAILY AS NEEDED FOR ANXIETY       spironolactone (ALDACTONE) 50 MG tablet        Vitamin D3 (CHOLECALCIFEROL) 25 mcg (1000 units) tablet          Allergies   Allergen Reactions     Clopidogrel Hives, Itching and Swelling     Rivaroxaban Hives and Itching        Social History     Tobacco Use     Smoking status: Never Smoker     Smokeless tobacco: Never Used   Substance Use Topics     Alcohol use: Not Currently     {FAMILY HISTORY (Optional):884024649}  History   Drug Use Unknown         Objective     LMP 2022     Physical Exam  {EXAM Preop Choices:922746}    Recent Labs   Lab Test 21  1717   HGB 13.7         POTASSIUM 4.3   CR 0.87        Diagnostics:  {LABS:356381}   {EK}    Revised Cardiac Risk Index (RCRI):  The patient has the following serious cardiovascular risks for perioperative complications:  {PREOP REVISED CARDIAC RISK INDEX (RCRI) :563179::\" - No serious cardiac risks = 0 points\"}     RCRI Interpretation: {REVISED CARDIAC RISK INTERPRETATION :367520}         Signed " Electronically by: Deandra Sanabria PA-C  Copy of this evaluation report is provided to requesting physician.    {Provider Resources  [facility]  Revised Cardiac Risk Index :705007}

## 2022-07-06 NOTE — PROGRESS NOTES
"Assessment and Plan:     (I87.1) May-Thurner syndrome  (primary encounter diagnosis)  Comment: see below  Plan: Vascular Medicine Referral      (Z86.521) History of pulmonary embolism  Comment: 2016, provoked, was on OCPs at the time, no recurrence, would like to have breast reduction surgery but needs \"anticoagulation plan\", I am not sure that she needs cheikh-op anticoagulation but will refer to vascular medicine to go over history and plan if needed  Plan: to vascular medicine       Deandra Sanabria PA-C        Subjective     HPI   Freida is a 27 year old presenting for the following health issues:  Is here to discuss her upcoming surgery  She is planning to have breast reduction surgery in the near future but was told she needs to have a \"anticoagulation plan\" prior to the surgery  She has history of DVT/PE and May-Thurner syndrome  The clots occurred while she was on oral contraceptive pills in 2016, she has a stent in her LLE  She has since stopped any exogenous estrogen and had a negative hypercoag work-up  She has had any clots since the initial one in 2016  She denies chest pain, sob, leg swelling    Review of Systems   See above      Objective    /78 (BP Location: Right arm, Cuff Size: Adult Large)   Pulse 109   Temp 99.3  F (37.4  C) (Tympanic)   Resp 16   Ht 1.613 m (5' 3.5\")   Wt 80.1 kg (176 lb 9.6 oz)   LMP 06/16/2022 (Exact Date)   SpO2 97%   BMI 30.79 kg/m    Body mass index is 30.79 kg/m .     Physical Exam     GENERAL: healthy, alert and no distress  RESP: lungs clear to auscultation - no rales, no rhonchi, no wheezes  CV: regular rates and rhythm, normal S1 S2, no S3 or S4 and no murmur, no click or rub   MS: extremities- no gross deformities noted, no edema  SKIN: no suspicious lesions, no rashes          .  ..  "

## 2022-07-06 NOTE — TELEPHONE ENCOUNTER
"Pt referred to [facility] by Deandra Neri PA-C for May-Thurner syndrome.  \"history of DVT/PT, May-Thurner\"    Pt needs to be scheduled for consult with vascular medicine.  Will route to scheduling to coordinate an appointment at next available.    Appointment note: Referred to [facility] by Deandra Neri PA-C for May-Thurner syndrome. \"history of DVT/PT, May-Thurner\"    Zandra LOMAX, RN    [facility]  [facility]  Office: [phone]  Fax: [phone]    "

## 2022-07-18 ENCOUNTER — TELEPHONE (OUTPATIENT)
Dept: PLASTIC SURGERY | Facility: CLINIC | Age: 27
End: 2022-07-18

## 2022-07-20 NOTE — TELEPHONE ENCOUNTER
Called patient on 7/18 and M to call writer back. Sent MyChart on 7/20 to patient as writer had no call back from patient to schedule surgery with Dr. Govea. Provided direct call back number to writer.

## 2022-07-21 ENCOUNTER — TELEPHONE (OUTPATIENT)
Dept: PLASTIC SURGERY | Facility: CLINIC | Age: 27
End: 2022-07-21

## 2022-07-21 ENCOUNTER — OFFICE VISIT (OUTPATIENT)
Dept: OTHER | Facility: CLINIC | Age: 27
End: 2022-07-21
Attending: PHYSICIAN ASSISTANT
Payer: COMMERCIAL

## 2022-07-21 VITALS
SYSTOLIC BLOOD PRESSURE: 104 MMHG | HEIGHT: 64 IN | OXYGEN SATURATION: 96 % | DIASTOLIC BLOOD PRESSURE: 74 MMHG | HEART RATE: 109 BPM | WEIGHT: 178.2 LBS | BODY MASS INDEX: 30.42 KG/M2

## 2022-07-21 DIAGNOSIS — I87.1 MAY-THURNER SYNDROME: ICD-10-CM

## 2022-07-21 PROBLEM — N62 MACROMASTIA: Status: ACTIVE | Noted: 2022-07-21

## 2022-07-21 PROCEDURE — 99203 OFFICE O/P NEW LOW 30 MIN: CPT | Performed by: PHYSICIAN ASSISTANT

## 2022-07-21 PROCEDURE — G0463 HOSPITAL OUTPT CLINIC VISIT: HCPCS

## 2022-07-21 NOTE — PROGRESS NOTES
"Patient is here to discuss consult.    /74 (BP Location: Left arm, Patient Position: Chair, Cuff Size: Adult Large)   Pulse 109   Ht 5' 4\" (1.626 m)   Wt 178 lb 3.2 oz (80.8 kg)   LMP 06/16/2022 (Exact Date)   SpO2 96%   BMI 30.59 kg/m      Questions patient would like addressed today are: Anticoagulation plan.    Refills are needed: N/A    Has homecare services and agency name:  Melissa H[facility]  "

## 2022-07-21 NOTE — PROGRESS NOTES
[facility] INITIAL VASCULAR MEDICINE CONSULT      PRIMARY HEALTH CARE PROVIDER:  Isac Conley      REFERRING HEALTH CARE PROVIDER;  Deandra Villalta *      REASON FOR CONSULT:  DVT prophylaxis cheikh-operatively.       HPI: Freida Aguilar is a very pleasant 27 year old female who in 2016 developed a left lower extremity DVT. In [place] she underwent catheter-directed lysis and stenting of the left iliac vein for underlying May-Thurner anatomy. This was felt to be provoked in the setting of oral hormonal contraceptives and her underlying May-Thurner anatomy. She was initially treated with Xarelto and Plavix. However, she developed hives and these were both stopped. She was subsequently switched to Warfarin for 6 months. She did undergo hypercoagulable work-up and it was unremarkable. She is now off all anticoagulation and anti-platelet medications and doing well without any leg swelling or pain. Her last visit with imaging of her iliac vein stent was 5/28/21 and everything looked good with stent completely patent.     She now wants breast reduction surgery. Prior to surgery the surgical team wanted an input on whether or not any specific DVT prophylaxis was needed. She presents today to discuss this.     PAST MEDICAL HISTORY  Past Medical History:   Diagnosis Date     Acne      Acute deep vein thrombosis (DVT) of lower extremity (H) 2016    while on OCPs     May-Thurner syndrome        CURRENT MEDICATIONS  DULoxetine HCl 40 MG CPEP,   hyoscyamine SL (LEVSIN/SL) 0.125 MG sublingual tablet, Take 1 tablet (0.125 mg) by mouth 4 times daily as needed (abdominal cramping)  multivitamin w/minerals (THERA-VIT-M) tablet,   propranolol (INDERAL) 10 MG tablet, TAKE 1 TO 2 TABLETS BY MOUTH TWICE DAILY AS NEEDED FOR ANXIETY  spironolactone (ALDACTONE) 50 MG tablet,   Vitamin D3 (CHOLECALCIFEROL) 25 mcg (1000 units) tablet,     No current facility-administered medications on file prior to  Telephone Encounter by Jarvis Tamez RN, BSN at 03/02/18 04:59 PM     Author:  Jarvis Tamez RN, BSN Service:  (none) Author Type:  Registered Nurse     Filed:  03/02/18 05:01 PM Encounter Date:  3/2/2018 Status:  Signed     :  Jarvis Tamez RN, BSN (Registered Nurse)            Patient notified.   Mina Calvertbatool verbalized understanding of information given.[LH1.1T]  Appointment scheduled on 3/5 at 3:40 pm.[LH1.1M]      Revision History        User Key Date/Time User Provider Type Action    > LH1.1 03/02/18 05:01 PM Jarvis Tamez RN, BSN Registered Nurse Sign    M - Manual, T - Template visit.      PAST SURGICAL HISTORY:  Past Surgical History:   Procedure Laterality Date     COLONOSCOPY N/A 09/23/2021    Procedure: COLONOSCOPY;  Surgeon: Dionne Soliz MD;  Location: [facility] OR     ESOPHAGOSCOPY, GASTROSCOPY, DUODENOSCOPY (EGD), COMBINED N/A 09/23/2021    Procedure: ESOPHAGOGASTRODUODENOSCOPY, WITH BIOPSY;  Surgeon: Dionne Soliz MD;  Location: [facility] OR     HEMORRHOID SURGERY       IR STENT VASCULAR LT Left      WISDOM TOOTH EXTRACTION Bilateral        ALLERGIES     Allergies   Allergen Reactions     Clopidogrel Hives, Itching and Swelling     Rivaroxaban Hives and Itching       FAMILY HISTORY  Family History   Problem Relation Age of Onset     Kidney Cancer Maternal Grandfather      Lung Cancer Paternal Grandfather      Breast Cancer No family hx of      Clotting Disorder No family hx of      Colon Cancer No family hx of        SOCIAL HISTORY  Social History     Socioeconomic History     Marital status: Single     Spouse name: Not on file     Number of children: Not on file     Years of education: Not on file     Highest education level: Not on file   Occupational History     Not on file   Tobacco Use     Smoking status: Never Smoker     Smokeless tobacco: Never Used   Substance and Sexual Activity     Alcohol use: Not Currently     Drug use: Not Currently     Sexual activity: Never     Birth control/protection: None       ROS:   General: No change in weight, sleep or appetite.  Normal energy.  No fever or chills  Eyes: Negative for vision changes or eye problems  ENT: No problems with ears, nose or throat.  No difficulty swallowing.  Resp: No coughing, wheezing or shortness of breath  CV: No chest pains or palpitations  GI: No nausea, vomiting,  heartburn, abdominal pain, diarrhea, constipation or change in bowel habits  : No urinary frequency or dysuria, bladder or kidney problems  Musculoskeletal: No significant muscle or joint pains  Neurologic: No headaches, numbness, tingling, weakness,  "problems with balance or coordination  Psychiatric: No problems with anxiety, depression or mental health  Heme/immune/allergy: No history of bleeding or clotting problems or anemia.  No allergies or immune system problems  Endocrine: No history of thyroid disease, diabetes or other endocrine disorders  Skin: No rashes,worrisome lesions or skin problems  Vascular:  No claudication, lifestyle limiting or otherwise; no ischemic rest pain; no non-healing ulcers. No weakness, No loss of sensation    EXAM:  /74 (BP Location: Left arm, Patient Position: Chair, Cuff Size: Adult Large)   Pulse 109   Ht 5' 4\" (1.626 m)   Wt 178 lb 3.2 oz (80.8 kg)   LMP 06/16/2022 (Exact Date)   SpO2 96%   BMI 30.59 kg/m    In general, the patient is a pleasant female in no apparent distress.    HEENT: NC/AT.  PERRLA.  EOMI.  Sclerae white, not injected. Dentition intact.    Heart: RRR. Normal S1, S2 splits physiologically. No murmur, rub, click, or gallop.    Lungs: CTA.  No ronchi, wheezes, rales.   Abdomen: Soft, nontender, nondistended.   Extremities: No clubbing, cyanosis, or edema.  No wounds.       Labs:  LIVER ENZYME RESULTS:  Lab Results   Component Value Date    AST 22 04/26/2021    ALT 47 04/26/2021       CBC RESULTS:  Lab Results   Component Value Date    WBC 7.6 04/26/2021    RBC 4.45 04/26/2021    HGB 13.7 04/26/2021    HCT 39.8 04/26/2021    MCV 89 04/26/2021    MCH 30.8 04/26/2021    MCHC 34.4 04/26/2021    RDW 11.2 04/26/2021     04/26/2021       BMP RESULTS:  Lab Results   Component Value Date     04/26/2021    POTASSIUM 4.3 04/26/2021    CHLORIDE 105 04/26/2021    CO2 29 04/26/2021    ANIONGAP 5 04/26/2021    GLC 82 04/26/2021    BUN 11 04/26/2021    CR 0.87 04/26/2021    GFRESTIMATED >90 04/26/2021    GFRESTBLACK >90 04/26/2021    BIRGIT 9.0 04/26/2021      THYROID RESULTS:  Lab Results   Component Value Date    TSH 3.16 04/26/2021       Procedures:   LEFT LOWER EXTREMITY DUPLEX VENOUS ULTRASOUND " 5/28/2021 4:38 PM     CLINICAL HISTORY: history of left iliac stenting for may-thurner, hx  of dvt; May-Thurner syndrome.      COMPARISONS: Pelvic venous duplex ultrasound 5.28/20221     REFERRING PROVIDER: CORIE RUIZ     TECHNIQUE: Grayscale, color Doppler, Doppler waveform ultrasound  evaluation was performed through the left common femoral, femoral, and  popliteal veins. Left posterior tibial and peroneal veins were  evaluated with grayscale imaging and compression.     Right common femoral vein was evaluated for symmetry.     FINDINGS: Right common femoral vein is patent, fully compressible, and  demonstrates normal phasic Doppler waveform.     Left common femoral vein is patent, fully compressible, and  demonstrates a normal phasic Doppler waveform.     Left femoral and popliteal vein waveforms are blunted but the femoral  and popliteal veins are fully compressible and patent.     Left posterior tibial veins are fully compressible to the ankle.     Left peroneal veins are fully compressible to the distal calf.                                                                      IMPRESSION: No deep venous thrombosis demonstrated in the LEFT leg.        ULTRASOUND INFERIOR VENA CAVA TO LEFT EXTERNAL ILIAC VEIN  5/28/2021  4:38 PM     CLINICAL HISTORY: hx of left ililac stenting for may thurner;  May-Thurner syndrome.      COMPARISONS: None available.     REFERRING PROVIDER: CORIE RUIZ     TECHNIQUE: Inferior vena cava through left external iliac vein  evaluated with grayscale, color Doppler, and Doppler waveform  ultrasound.     FINDINGS:   Inferior vena cava: 48 cm/s, phasic     LEFT:       STENT, common iliac vein: 21 cm/s, phasic       STENT, external iliac vein, central: 14 cm/s, flattened but  phasic       STENT, external iliac vein, mid: 17 cm/s, flattened but phasic       STENT, external iliac vein, peripheral: 13 cm/s, flattened but  phasic       STENT, external iliac vein, peripheral: 27  cm/s, phasic     Left iliac stent fills [id number] in color Doppler images.                                                                      IMPRESSION: Left iliac venous stent fills [id number] in color  Doppler images. Negative study.       Assessment and Plan:   History of first lifetime thromboembolic event of left lower extremity DVT in setting of oral contraceptive pills and underlying May-Thurner anatomy     -Stent is widely patent on most recent imaging with complete resorption of DVT.   -DVT was provoked. She is no longer on hormonal contraceptives and May-Thurner anatomy was stented.   -Hypercoagulable work-up was negative. Already completed over 6 months of therapeutic anticoagulation.      Recommendations:   -No need for any prophylactic anticoagulation in the post-operative setting of breast reduction surgery.   -She is cleared from a Vascular perspective for surgery.       Tessa Marshall PA-C      30 minutes spent on the date of the encounter doing chart review, history and exam, documentation, and further activities as noted above.

## 2022-07-21 NOTE — TELEPHONE ENCOUNTER
Spoke with patient and scheduled surgery for 10/12 with Dr. Govea in [place]. Offered earlier date but PT declined,    H&P with PCP.    COVID test will be taken 1-2 days before surgery and will bring a picture of the results on the morning of surgery.    Pre-op consult scheduled for 9/27.    Post-op scheduled for 10/28.    Writer will mail surg packet.

## 2022-08-03 NOTE — LETTER
6/28/2022       RE: Freida Aguilar  [address]  [place] [address]     Dear Colleague,    Thank you for referring your patient, Freida Aguilar, to the [facility] [place] at [facility]. Please see a copy of my visit note below.    PLASTIC SURGERY HISTORY AND PHYSICAL    Chief Complaint:Hypertrophy of breasts   Referring Provider: Isac Conley      HPI: Reva  is a 27 year old female who presents with symptomatic macromastia. She currently wears a G, would prefer to be a D. She complains of back pain, shoulder pain, neck pain for 8 years. Feels that large breasts make it difficult to exercise. Also feels that they affect her at night, hard to get comfortable because of the large breasts. She has tried the following without successful resolution of pain symptoms:  Chiropractor who also recommended home daily exercises, pain medication including tylenol/ibuprofen, buying specialty bras. No family history of breast cancer.    Recent mammogram: no  Rashes: yes. Intermittent for 6 years. No topicals.     DVT/PE-on OCP at the time. May 2016. Was in ICU for procedure to remove clot from leg and place stent. Has a history of May Thurner. Took clopidigrel and rivaroxiban but allergic to both, ended up on warfarin for 6 months.    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    PSH:   Past Surgical History:   Procedure Laterality Date     COLONOSCOPY N/A 09/23/2021     ESOPHAGOSCOPY, GASTROSCOPY, DUODENOSCOPY (EGD), COMBINED N/A 09/23/2021     HEMORRHOID SURGERY       IR STENT VASCULAR LT Left      WISDOM TOOTH EXTRACTION Bilateral        FH:   Family History   Problem Relation Age of Onset     Kidney Cancer Maternal Grandfather      Lung Cancer Paternal Grandfather      Breast Cancer No family  "hx of      Clotting Disorder No family hx of      Colon Cancer No family hx of         SH:   Social History     Tobacco Use     Smoking status: Never Smoker     Smokeless tobacco: Never Used   Substance Use Topics     Alcohol use: Not Currently     Drug use: Not Currently      Work/school: .    MEDS:     Current Outpatient Medications:      DULoxetine HCl 40 MG CPEP, , Disp: , Rfl:      hyoscyamine SL (LEVSIN/SL) 0.125 MG sublingual tablet, Take 1 tablet (0.125 mg) by mouth 4 times daily as needed (abdominal cramping), Disp: 60 tablet, Rfl: 3     multivitamin w/minerals (THERA-VIT-M) tablet, , Disp: , Rfl:      propranolol (INDERAL) 10 MG tablet, TAKE 1 TO 2 TABLETS BY MOUTH TWICE DAILY AS NEEDED FOR ANXIETY, Disp: , Rfl:      spironolactone (ALDACTONE) 50 MG tablet, , Disp: , Rfl:      Vitamin D3 (CHOLECALCIFEROL) 25 mcg (1000 units) tablet, , Disp: , Rfl:        ALLERGIES:     Allergies   Allergen Reactions     Clopidogrel Hives, Itching and Swelling     Rivaroxaban Hives and Itching   hives and swelling to both     ROS: + DVT/PE. Denies chest pain, shortness of breath, MI, CVA,  and bleeding disorders.     PHYSICAL EXAMINATION:   /83   Pulse 103   Ht 1.6 m (5' 3\")   Wt 79.3 kg (174 lb 12.8 oz)   LMP 05/27/2022   SpO2 100%   BMI 30.96 kg/m     BMI: Body mass index is 30.96 kg/m .  Body surface area is 1.88 meters squared.   General: NAD  Chest: bilateral macromastia with grade III ptosis.   +marks on shoulders from bra    500g     ASSESSMENT: 26 yo F PMH May Thurner syndrome with DVT/PE in 2016 s/p stent placement,      PLAN:     I believe she is a good candidate for a breast reduction.According to Schnur scale 500g will need to be removed, this will be approximately 60% reduction from her current size. She understands that this amount will need to be removed to be covered by insurance. Risks and benefits of the procedure were discussed, including but not limited to bleeding, infection, " scarring, wound healing complications, asymmetry, loss of nipple sensation, nipple necrosis, inability to breast feed and change in size of breasts in the future. She understands the risks and would like to proceed with surgery.    She will need an anticoagulation plan from her PCP prior to surgery. She understands and will get this done. Chart review after patient left today reveals a visit to 6/2/2021 with vascular prior to GI procedure, no anticoagulation was needed at that time.     I will place orders today.       Marj Dunn PA-C  Plastic and Reconstructive Surgery       45 minutes spent on the date of the encounter doing chart review, history and physical, documentation and further activity as noted above.   Plan: Pt needs to get baseline labs prior to us sending in RX. She was confirmed in ipledge and reminded of the seven day window Render In Strict Bullet Format?: No Detail Level: Zone

## 2022-09-19 NOTE — TELEPHONE ENCOUNTER
RN Care Coordinator: Soha Kahn; [phone]    Surgery is scheduled with Dr. Govea   Date: 10/19   Location: [facility]  Scheduled per need to reschedule due to urgent case    H&P to be completed by: Primary Care    Surgical consult: 9/27     COVID-19 test: patient to complete an at-home test 1-2 days prior to scheduled date and bring a picture of negative results or call [phone] option # 7 to schedule a PCR test within 4 days of the scheduled date     Post-op: 11/1 with Marj Dunn PA-C    Patient will receive a phone call from pre-admission nurses 1-2 days prior to surgery with arrival and start time.        Left a detailed voicemail regarding the scheduled information and requested a call back to discuss. Provided direct line.      Patient questions/concerns: N/A no action needed

## 2022-09-25 ENCOUNTER — HEALTH MAINTENANCE LETTER (OUTPATIENT)
Age: 27
End: 2022-09-25

## 2022-09-27 ENCOUNTER — TELEPHONE (OUTPATIENT)
Dept: PLASTIC SURGERY | Facility: CLINIC | Age: 27
End: 2022-09-27

## 2022-09-27 ENCOUNTER — OFFICE VISIT (OUTPATIENT)
Dept: PEDIATRICS | Facility: CLINIC | Age: 27
End: 2022-09-27
Payer: COMMERCIAL

## 2022-09-27 ENCOUNTER — OFFICE VISIT (OUTPATIENT)
Dept: PLASTIC SURGERY | Facility: CLINIC | Age: 27
End: 2022-09-27
Attending: PLASTIC SURGERY
Payer: COMMERCIAL

## 2022-09-27 VITALS
OXYGEN SATURATION: 97 % | WEIGHT: 182.7 LBS | RESPIRATION RATE: 16 BRPM | HEART RATE: 89 BPM | SYSTOLIC BLOOD PRESSURE: 102 MMHG | BODY MASS INDEX: 31.85 KG/M2 | DIASTOLIC BLOOD PRESSURE: 64 MMHG | TEMPERATURE: 98.2 F

## 2022-09-27 VITALS
RESPIRATION RATE: 16 BRPM | BODY MASS INDEX: 31.1 KG/M2 | OXYGEN SATURATION: 98 % | SYSTOLIC BLOOD PRESSURE: 109 MMHG | WEIGHT: 182.2 LBS | HEIGHT: 64 IN | TEMPERATURE: 98.1 F | DIASTOLIC BLOOD PRESSURE: 77 MMHG | HEART RATE: 95 BPM

## 2022-09-27 DIAGNOSIS — Z23 NEED FOR INFLUENZA VACCINATION: ICD-10-CM

## 2022-09-27 DIAGNOSIS — N62 MACROMASTIA: Primary | ICD-10-CM

## 2022-09-27 DIAGNOSIS — Z01.818 PREOP GENERAL PHYSICAL EXAM: Primary | ICD-10-CM

## 2022-09-27 DIAGNOSIS — I87.1 MAY-THURNER SYNDROME: ICD-10-CM

## 2022-09-27 DIAGNOSIS — N62 MACROMASTIA: ICD-10-CM

## 2022-09-27 PROBLEM — F32.9 MAJOR DEPRESSION: Status: RESOLVED | Noted: 2021-03-06 | Resolved: 2022-09-27

## 2022-09-27 PROCEDURE — 90686 IIV4 VACC NO PRSV 0.5 ML IM: CPT | Performed by: STUDENT IN AN ORGANIZED HEALTH CARE EDUCATION/TRAINING PROGRAM

## 2022-09-27 PROCEDURE — G0463 HOSPITAL OUTPT CLINIC VISIT: HCPCS

## 2022-09-27 PROCEDURE — 90471 IMMUNIZATION ADMIN: CPT | Performed by: STUDENT IN AN ORGANIZED HEALTH CARE EDUCATION/TRAINING PROGRAM

## 2022-09-27 PROCEDURE — 99214 OFFICE O/P EST MOD 30 MIN: CPT | Mod: 25 | Performed by: STUDENT IN AN ORGANIZED HEALTH CARE EDUCATION/TRAINING PROGRAM

## 2022-09-27 PROCEDURE — 99214 OFFICE O/P EST MOD 30 MIN: CPT | Performed by: PLASTIC SURGERY

## 2022-09-27 SDOH — ECONOMIC STABILITY: TRANSPORTATION INSECURITY
IN THE PAST 12 MONTHS, HAS THE LACK OF TRANSPORTATION KEPT YOU FROM MEDICAL APPOINTMENTS OR FROM GETTING MEDICATIONS?: NO

## 2022-09-27 SDOH — ECONOMIC STABILITY: TRANSPORTATION INSECURITY
IN THE PAST 12 MONTHS, HAS LACK OF TRANSPORTATION KEPT YOU FROM MEETINGS, WORK, OR FROM GETTING THINGS NEEDED FOR DAILY LIVING?: NO

## 2022-09-27 SDOH — ECONOMIC STABILITY: FOOD INSECURITY: WITHIN THE PAST 12 MONTHS, YOU WORRIED THAT YOUR FOOD WOULD RUN OUT BEFORE YOU GOT MONEY TO BUY MORE.: NEVER TRUE

## 2022-09-27 SDOH — ECONOMIC STABILITY: INCOME INSECURITY: IN THE LAST 12 MONTHS, WAS THERE A TIME WHEN YOU WERE NOT ABLE TO PAY THE MORTGAGE OR RENT ON TIME?: NO

## 2022-09-27 SDOH — ECONOMIC STABILITY: INCOME INSECURITY: HOW HARD IS IT FOR YOU TO PAY FOR THE VERY BASICS LIKE FOOD, HOUSING, MEDICAL CARE, AND HEATING?: NOT HARD AT ALL

## 2022-09-27 SDOH — HEALTH STABILITY: PHYSICAL HEALTH: ON AVERAGE, HOW MANY DAYS PER WEEK DO YOU ENGAGE IN MODERATE TO STRENUOUS EXERCISE (LIKE A BRISK WALK)?: 2 DAYS

## 2022-09-27 SDOH — ECONOMIC STABILITY: FOOD INSECURITY: WITHIN THE PAST 12 MONTHS, THE FOOD YOU BOUGHT JUST DIDN'T LAST AND YOU DIDN'T HAVE MONEY TO GET MORE.: NEVER TRUE

## 2022-09-27 SDOH — HEALTH STABILITY: PHYSICAL HEALTH: ON AVERAGE, HOW MANY MINUTES DO YOU ENGAGE IN EXERCISE AT THIS LEVEL?: 30 MIN

## 2022-09-27 ASSESSMENT — SOCIAL DETERMINANTS OF HEALTH (SDOH)
HOW OFTEN DO YOU GET TOGETHER WITH FRIENDS OR RELATIVES?: NEVER
ARE YOU MARRIED, WIDOWED, DIVORCED, SEPARATED, NEVER MARRIED, OR LIVING WITH A PARTNER?: NEVER MARRIED
HOW OFTEN DO YOU ATTEND CHURCH OR RELIGIOUS SERVICES?: NEVER
IN A TYPICAL WEEK, HOW MANY TIMES DO YOU TALK ON THE PHONE WITH FAMILY, FRIENDS, OR NEIGHBORS?: MORE THAN THREE TIMES A WEEK
DO YOU BELONG TO ANY CLUBS OR ORGANIZATIONS SUCH AS CHURCH GROUPS UNIONS, FRATERNAL OR ATHLETIC GROUPS, OR SCHOOL GROUPS?: NO

## 2022-09-27 ASSESSMENT — LIFESTYLE VARIABLES
AUDIT-C TOTAL SCORE: 0
HOW OFTEN DO YOU HAVE SIX OR MORE DRINKS ON ONE OCCASION: NEVER
SKIP TO QUESTIONS 9-10: 1
HOW MANY STANDARD DRINKS CONTAINING ALCOHOL DO YOU HAVE ON A TYPICAL DAY: PATIENT DOES NOT DRINK
HOW OFTEN DO YOU HAVE A DRINK CONTAINING ALCOHOL: NEVER

## 2022-09-27 ASSESSMENT — PAIN SCALES - GENERAL
PAINLEVEL: NO PAIN (0)
PAINLEVEL: NO PAIN (0)

## 2022-09-27 NOTE — NURSING NOTE
Pre Op Teaching Flowsheet       Pre and Post op Patient Education  Relevant Diagnosis:  macromastia  Surgical procedure:  BRM  Teaching Topic:  Pre and post op teaching  Person Involved in teaching: Yes    Motivation Level:  Asks Questions: Yes  Eager to Learn: Yes  Cooperative: Yes  Receptive (willing/able to accept information):  Yes    Patient demonstrates understanding of the following:  Date of surgery:  10/19/22  Location of surgery:  [facility] - 5th Floor  History and Physical and any other testing necessary prior to surgery: Yes  Required time line for completion of History and Physical and any pre-op testing: Yes    Patient demonstrates understanding of the following:  Pre-op bowel prep:  N/A  Pre-op showering/scrub information with PCMX Soap: Yes  Blood thinner medications discussed and when to stop (if applicable):  Per PCP at preop    Infection Prevention:   Patient demonstrates understanding of the following:  Surgical procedure site care taught: Yes  Signs and symptoms of infection taught: Yes    Post-op follow-up:  Discussed how to contact the hospital, nurse, and clinic scheduling staff if necessary. (See packet information)    Instructional materials used/given/mailed:  [facility], post op teaching sheet, Map, Soap, and with the arrival/location information to come closer to the surgery date.

## 2022-09-27 NOTE — NURSING NOTE
"Oncology Rooming Note    September 27, 2022 9:50 AM   Freida Aguilar is a 27 year old female who presents for:    Chief Complaint   Patient presents with     Oncology Clinic Visit     Pre-op consult     Initial Vitals: /77 (BP Location: Right arm, Patient Position: Sitting, Cuff Size: Adult Large)   Pulse 95   Temp 98.1  F (36.7  C) (Oral)   Resp 16   Ht 1.613 m (5' 3.5\")   Wt 82.6 kg (182 lb 3.2 oz)   SpO2 98%   BMI 31.77 kg/m   Estimated body mass index is 31.77 kg/m  as calculated from the following:    Height as of this encounter: 1.613 m (5' 3.5\").    Weight as of this encounter: 82.6 kg (182 lb 3.2 oz). Body surface area is 1.92 meters squared.  No Pain (0) Comment: Data Unavailable   No LMP recorded.  Allergies reviewed: Yes  Medications reviewed: Yes    Medications: Medication refills not needed today.  Pharmacy name entered into [facility]:    [facility] #[id number] - [place], [place] - [address] AT [facility] [address] [facility] [address]  [id number] - [place], [place] - [address] [id number] - [place], [place] - [address] AT [facility]    Clinical concerns: None    Ghassan Coelho            "

## 2022-09-27 NOTE — LETTER
9/27/2022         RE: Freida Aguilar  [address]  [place] [address]        Dear Colleague,    Thank you for referring your patient, Freida Aguilar, to the [facility]. Please see a copy of my visit note below.    PRESENTING COMPLAINT:  Preop visit for upcoming bilateral breast reduction scheduled for 10/19/2022.    HISTORY OF PRESENTING COMPLAINT:  Ms. Aguilar is 27 years old.  She saw my PA a couple months ago for breast reduction, scheduled for the surgery, here for preoperative visit.  No change in her history and physical exam.    The patient does not have a history of breast cancer.  Her Schnur scale is 500 grams.  The most important thing to realize is the patient was diagnosed with May-Thurner syndrome and had a blood clot in her left lower extremity, stenting down to the left external iliac vein.  This was a provoked DVT, treated with 6 months of Coumadin.  The patient has been cleared to proceed with surgery without any anticoagulation intervention.  No change otherwise in her history and physical exam.    ASSESSMENT AND PLAN:  Based on the above findings, a diagnosis of symptomatic bilateral breast hypertrophy was made.  Based on the findings today, I think we can get the 500 grams off and give her a C cup size that she is looking for.  Went over the planned procedure with the patient in detail.      All perioperative and postoperative expectations were made clear.  All risks, benefits and alternatives including pain, infection, bleeding, scarring, asymmetry, seromas, hematomas, wound breakdown, wound dehiscence, asymmetries, inability to promise a cup size, prominent scar, hypertrophic scar, keloid scar, numbness of the nipple and breast, requirement of free nipple graft, nipple necrosis, skin necrosis, T-junction site necrosis, DVT, PE, MI, CVA, pneumonia, renal failure and death were explained and discussed and she wants to proceed.       Expectations of scarring, numbness, inability to breast feed, asymmetries, inability to promise a cup size and rehypertrophy in the future were made very clear and she understood and agreed.      All questions were answered.  She was happy with the visit.  All exam and discussion done in presence of my nurse, Soha Kahn.    Total time spent with chart review, visit itself and post-visit paperwork was 30 minutes.        Again, thank you for allowing me to participate in the care of your patient.      Sincerely,    RANJEET Govea MD

## 2022-09-27 NOTE — PATIENT INSTRUCTIONS
Preparing for Your Surgery  Getting started  A nurse will call you to review your health history and instructions. They will give you an arrival time based on your scheduled surgery time. Please be ready to share:    Your doctor's clinic name and phone number    Your medical, surgical and anesthesia history    A list of allergies and sensitivities    A list of medicines, including herbal treatments and over-the-counter drugs    Whether the patient has a legal guardian (ask how to send us the papers in advance)  Please tell us if you're pregnant--or if there's any chance you might be pregnant. Some surgeries may injure a fetus (unborn baby), so they require a pregnancy test. Surgeries that are safe for a fetus don't always need a test, and you can choose whether to have one.   If you have a child who's having surgery, please ask for a copy of Preparing for Your Child's Surgery.    Preparing for surgery    Within 10 to 30 days of surgery: Have a pre-op exam (sometimes called an H&P, or History and Physical). This can be done at a clinic or pre-operative center.  ? If you're having a , you may not need this exam. Talk to your care team.    At your pre-op exam, talk to your care team about all medicines you take. If you need to stop any medicines before surgery, ask when to start taking them again.  ? We do this for your safety. Many medicines can make you bleed too much during surgery. Some change how well surgery (anesthesia) drugs work.    Call your insurance company to let them know you're having surgery. (If you don't have insurance, call [phone].)    Call your clinic if there's any change in your health. This includes signs of a cold or flu (sore throat, runny nose, cough, rash, fever). It also includes a scrape or scratch near the surgery site.    If you have questions on the day of surgery, call your hospital or surgery center.  COVID testing  You may need to be tested for COVID-19 before having  surgery. If so, we will give you instructions (or click here).  Eating and drinking guidelines  For your safety: Unless your surgeon tells you otherwise, follow the guidelines below.    Eat and drink as usual until 8 hours before surgery. After that, no food or milk.    Drink clear liquids until 2 hours before surgery. These are liquids you can see through, like water, Gatorade and Propel Water. You may also have black coffee and tea (no cream or milk).    Nothing by mouth within 2 hours of surgery. This includes gum, candy and breath mints.    If you drink alcohol: Stop drinking it the night before surgery.    If your care team tells you to take medicine on the morning of surgery, it's okay to take it with a sip of water.  Preventing infection    Shower or bathe the night before and morning of your surgery. Follow the instructions your clinic gave you. (If no instructions, use regular soap.)    Don't shave or clip hair near your surgery site. We'll remove the hair if needed.    Don't smoke or vape the morning of surgery. You may chew nicotine gum up to 2 hours before surgery. A nicotine patch is okay.  ? Note: Some surgeries require you to completely quit smoking and nicotine. Check with your surgeon.    Your care team will make every effort to keep you safe from infection. We will:  ? Clean our hands often with soap and water (or an alcohol-based hand rub).  ? Clean the skin at your surgery site with a special soap that kills germs.  ? Give you a special gown to keep you warm. (Cold raises the risk of infection.)  ? Wear special hair covers, masks, gowns and gloves during surgery.  ? Give antibiotic medicine, if prescribed. Not all surgeries need antibiotics.  What to bring on the day of surgery    Photo ID and insurance card    Copy of your health care directive, if you have one    Glasses and hearing aides (bring cases)  ? You can't wear contacts during surgery    Inhaler and eye drops, if you use them (tell us  about these when you arrive)    CPAP machine or breathing device, if you use them    A few personal items, if spending the night    If you have . . .  ? A pacemaker, ICD (cardiac defibrillator) or other implant: Bring the ID card.  ? An implanted stimulator: Bring the remote control.  ? A legal guardian: Bring a copy of the certified (court-stamped) guardianship papers.  Please remove any jewelry, including body piercings. Leave jewelry and other valuables at home.  If you're going home the day of surgery    You must have a responsible adult drive you home. They should stay with you overnight as well.    If you don't have someone to stay with you, and you aren't safe to go home alone, we may keep you overnight. Insurance often won't pay for this.  After surgery  If it's hard to control your pain or you need more pain medicine, please call your surgeon's office.  Questions?   If you have any questions for your care team, list them here: _________________________________________________________________________________________________________________________________________________________________________ ____________________________________ ____________________________________ ____________________________________  For informational purposes only. Not to replace the advice of your health care provider. Copyright   2003, 2019 [facility]. All rights reserved. Clinically reviewed by Tomasa Cruz MD. [employer] [id number] - REV 07/22.

## 2022-09-27 NOTE — PROGRESS NOTES
PRESENTING COMPLAINT:  Preop visit for upcoming bilateral breast reduction scheduled for 10/19/2022.    HISTORY OF PRESENTING COMPLAINT:  Ms. Aguilar is 27 years old.  She saw my PA a couple months ago for breast reduction, scheduled for the surgery, here for preoperative visit.  No change in her history and physical exam.    The patient does not have a history of breast cancer.  Her Schnur scale is 500 grams.  The most important thing to realize is the patient was diagnosed with May-Thurner syndrome and had a blood clot in her left lower extremity, stenting down to the left external iliac vein.  This was a provoked DVT, treated with 6 months of Coumadin.  The patient has been cleared to proceed with surgery without any anticoagulation intervention.  No change otherwise in her history and physical exam.    ASSESSMENT AND PLAN:  Based on the above findings, a diagnosis of symptomatic bilateral breast hypertrophy was made.  Based on the findings today, I think we can get the 500 grams off and give her a C cup size that she is looking for.  Went over the planned procedure with the patient in detail.      All perioperative and postoperative expectations were made clear.  All risks, benefits and alternatives including pain, infection, bleeding, scarring, asymmetry, seromas, hematomas, wound breakdown, wound dehiscence, asymmetries, inability to promise a cup size, prominent scar, hypertrophic scar, keloid scar, numbness of the nipple and breast, requirement of free nipple graft, nipple necrosis, skin necrosis, T-junction site necrosis, DVT, PE, MI, CVA, pneumonia, renal failure and death were explained and discussed and she wants to proceed.      Expectations of scarring, numbness, inability to breast feed, asymmetries, inability to promise a cup size and rehypertrophy in the future were made very clear and she understood and agreed.      All questions were answered.  She was happy with the visit.  All exam and  discussion done in presence of my nurse, Soha Kahn.    Total time spent with chart review, visit itself and post-visit paperwork was 30 minutes.

## 2022-09-27 NOTE — PROGRESS NOTES
[facility]  [address]  [address]  [place] [address]  Phone: [phone]  Fax: [phone]  Primary Provider: Isac Conley  Pre-op Performing Provider: FRANC WITT      PREOPERATIVE EVALUATION:  Today's date: 9/27/2022    Freida Aguilar is a 27 year old female who presents for a preoperative evaluation.    Surgical Information:  Surgery/Procedure: Mammoplasty  Surgery Location: [facility]  Surgeon: Jeferson  Surgery Date: 10/19/22  Time of Surgery: TBD  Where patient plans to recover: At home with family  Fax number for surgical facility: Note does not need to be faxed, will be available electronically in Epic.    Type of Anesthesia Anticipated: General    Assessment & Plan     The proposed surgical procedure is considered INTERMEDIATE risk.    Preop general physical exam  Medically optimized for surgery.    Macromastia  Getting mammoplasty due to symptoms.    May-Thurner syndrome  S/p left iliac stent for left lower extremity DVT which was noted to be patent in 2021. DVT possibly provoked by OCP which patient is no longer on. No other high risk features for subsequent DVT and do not think she required medical therapy for DVT prophylaxis post-op. Discussed ways to minimize risk of DVT post-operatively including adequate hydration, early ambulation, and to have low threshold to present to urgent care or ED (emergency department) if develops symptoms of lower extremity swelling or pain.  If surgery team requires more formal anticoagulation plan post-operatively would e-consult hematology due to history of bad reaction to DOAC in past.    Need for influenza vaccination  - INFLUENZA VACCINE IM > 6 MONTHS VALENT IIV4 (AFLURIA/FLUZONE)       Risks and Recommendations:  The patient has the following additional risks and recommendations for perioperative complications:  Anemia/Bleeding/Clotting:    - History of DVT or PE, consider  hematology e-consult if need post-op anticoagulation plan    Medication Instructions:  Patient is to take all scheduled medications on the day of surgery    RECOMMENDATION:  APPROVAL GIVEN to proceed with proposed procedure, without further diagnostic evaluation.            Subjective     HPI related to upcoming procedure:  Patient with history of symptomatic macromastia with back pain, shoulder pain, neck pain for several years with no improvement from chiropractor evaluation, daily home exercises, pain medication over the counter, bra fittings. Patient will be undergoing mammoplasty for breast reduction.    Remote history of left lower extremity DVT in May 2016 due to May Thurner syndrome s/p stenting. Was also on OCP at that time. Had allergic reaction  To Rivaroxaban and was on warfarin for a time.  Has been told anticoagulation plan was not necessary for prior procedure. Has seen vascular surgery in past and imaging in May 2021 showed patent stent in left common and external iliacs.         Preop Questions 9/27/2022   1. Have you ever had a heart attack or stroke? No   2. Have you ever had surgery on your heart or blood vessels, such as a stent placement, a coronary artery bypass, or surgery on an artery in your head, neck, heart, or legs? YES   3. Do you have chest pain with activity? No   4. Do you have a history of  heart failure? No   5. Do you currently have a cold, bronchitis or symptoms of other infection? No   6. Do you have a cough, shortness of breath, or wheezing? No   7. Do you or anyone in your family have previous history of blood clots? YES - May Thurner syndrome and left lower extremity DVT in 2016.   8. Do you or does anyone in your family have a serious bleeding problem such as prolonged bleeding following surgeries or cuts? No   9. Have you ever had problems with anemia or been told to take iron pills? No   10. Have you had any abnormal blood loss such as black, tarry or bloody stools, or  abnormal vaginal bleeding? No   11. Have you ever had a blood transfusion? No   12. Are you willing to have a blood transfusion if it is medically needed before, during, or after your surgery? Yes   13. Have you or any of your relatives ever had problems with anesthesia? No   14. Do you have sleep apnea, excessive snoring or daytime drowsiness? No   15. Do you have any artifical heart valves or other implanted medical devices like a pacemaker, defibrillator, or continuous glucose monitor? No   16. Do you have artificial joints? No   17. Are you allergic to latex? No   18. Is there any chance that you may be pregnant? No       Preoperative Review of :   reviewed - no record of controlled substances prescribed.      Status of Chronic Conditions:  See problem list for active medical problems.  Problems all longstanding and stable, except as noted/documented.  See ROS for pertinent symptoms related to these conditions.      Review of Systems  Constitutional, neuro, ENT, endocrine, pulmonary, cardiac, gastrointestinal, genitourinary, musculoskeletal, integument and psychiatric systems are negative, except as otherwise noted.    Patient Active Problem List    Diagnosis Date Noted     Macromastia 07/21/2022     Priority: Medium     Added automatically from request for surgery [id number]       May-Thurner syndrome 03/06/2021     Priority: Medium     Diagnosed after large DVT and bilateral diffuse PE precipitated by estrogen containing OCP. Stent placed       History of pulmonary embolism 03/06/2021     Priority: Medium     Major depression 03/06/2021     Priority: Medium     Abdominal pain, generalized 03/06/2021     Priority: Medium     Long standing history.  Unclear etiology, has had some previous work up--records not available.       Vitamin D deficiency 03/06/2021     Priority: Medium     Chronic constipation 03/06/2021     Priority: Medium     Nausea 03/06/2021     Priority: Medium     Abdominal pain, epigastric  03/02/2021     Priority: Medium     Family history of hypothyroidism 02/14/2020     Priority: Medium     Gastroesophageal reflux disease 02/14/2020     Priority: Medium     Hx of hemorrhoids 02/14/2020     Priority: Medium     hemorrhoidectomy       History of DVT (deep vein thrombosis) 05/26/2016     Priority: Medium      Past Medical History:   Diagnosis Date     Acne      Acute deep vein thrombosis (DVT) of lower extremity (H) 2016    while on OCPs     May-Thurner syndrome      Past Surgical History:   Procedure Laterality Date     COLONOSCOPY N/A 09/23/2021    Procedure: COLONOSCOPY;  Surgeon: Dionne Soliz MD;  Location: [facility] OR     ESOPHAGOSCOPY, GASTROSCOPY, DUODENOSCOPY (EGD), COMBINED N/A 09/23/2021    Procedure: ESOPHAGOGASTRODUODENOSCOPY, WITH BIOPSY;  Surgeon: Dionne Soliz MD;  Location: [facility] OR     HEMORRHOID SURGERY       IR STENT VASCULAR LT Left      WISDOM TOOTH EXTRACTION Bilateral      Current Outpatient Medications   Medication Sig Dispense Refill     DULoxetine HCl 40 MG CPEP        hyoscyamine SL (LEVSIN/SL) 0.125 MG sublingual tablet Take 1 tablet (0.125 mg) by mouth 4 times daily as needed (abdominal cramping) 60 tablet 3     multivitamin w/minerals (THERA-VIT-M) tablet        propranolol (INDERAL) 10 MG tablet TAKE 1 TO 2 TABLETS BY MOUTH TWICE DAILY AS NEEDED FOR ANXIETY       spironolactone (ALDACTONE) 50 MG tablet        Vitamin D3 (CHOLECALCIFEROL) 25 mcg (1000 units) tablet          Allergies   Allergen Reactions     Clopidogrel Hives, Itching and Swelling     Rivaroxaban Hives and Itching        Social History     Tobacco Use     Smoking status: Never Smoker     Smokeless tobacco: Never Used   Substance Use Topics     Alcohol use: Not Currently       History   Drug Use Unknown         Objective     /64 (BP Location: Right arm, Patient Position: Sitting, Cuff Size: Adult Large)   Pulse 89   Temp 98.2  F (36.8  C) (Tympanic)   Resp 16   Wt 82.9 kg (182 lb 11.2 oz)    SpO2 97%   BMI 31.85 kg/m      Physical Exam    GENERAL APPEARANCE: healthy, alert and no distress     EYES: EOMI     HENT: ear canals and TM's normal     NECK: no adenopathy, no asymmetry, masses, or scars and thyroid normal to palpation     RESP: lungs clear to auscultation - no rales, rhonchi or wheezes     CV: regular rates and rhythm, normal S1 S2, no S3 or S4 and no murmur, click or rub     ABDOMEN:  soft, nontender, no HSM or masses      MS: extremities normal- no gross deformities noted, no evidence of inflammation in joints, FROM in all extremities.     SKIN: no suspicious lesions or rashes     NEURO: Normal strength and tone, sensory exam grossly normal, mentation intact and speech normal     PSYCH: mentation appears normal. and affect normal/bright     LYMPHATICS: No cervical adenopathy    Recent Labs   Lab Test 04/26/21  1717   HGB 13.7         POTASSIUM 4.3   CR 0.87        Diagnostics:  No labs were ordered during this visit.   No EKG required, no history of coronary heart disease, significant arrhythmia, peripheral arterial disease or other structural heart disease.    Revised Cardiac Risk Index (RCRI):  The patient has the following serious cardiovascular risks for perioperative complications:   - No serious cardiac risks = 0 points     RCRI Interpretation: 0 points: Class I (very low risk - 0.4% complication rate)           Signed Electronically by: Blanca Rudd MD  Copy of this evaluation report is provided to requesting physician.

## 2022-09-28 NOTE — TELEPHONE ENCOUNTER
Attempt #2 - see encounter dated 7/21. Called patient on 9/21 and left detailed voicemail regarding need to reschedule surgery due to an urgent case. Patient had not returned call.    Patient had surgery consult today and sent a [employer] message after the visit stating she was unaware of the date change and stated no one contacted her. She does have FMLA forms completed already.    Left detailed voicemail regarding surgery reschedule and that a voicemail was left on 9/21 regarding this. Noted that we can have the clinic re-complete FMLA forms.     Requested call back, provided direct number.

## 2022-10-14 ENCOUNTER — TELEPHONE (OUTPATIENT)
Dept: PLASTIC SURGERY | Facility: CLINIC | Age: 27
End: 2022-10-14

## 2022-10-14 NOTE — TELEPHONE ENCOUNTER
Received call regarding scheduled surgery with Dr. Govea.     inquiring if surgery occurred on 10/12. Notified them that surgery was postponed to 10/19.     No further action needed.

## 2022-10-18 ENCOUNTER — ANESTHESIA EVENT (OUTPATIENT)
Dept: SURGERY | Facility: AMBULATORY SURGERY CENTER | Age: 27
End: 2022-10-18
Payer: COMMERCIAL

## 2022-10-19 ENCOUNTER — HOSPITAL ENCOUNTER (OUTPATIENT)
Facility: AMBULATORY SURGERY CENTER | Age: 27
Discharge: HOME | End: 2022-10-19
Attending: PLASTIC SURGERY
Payer: COMMERCIAL

## 2022-10-19 ENCOUNTER — ANESTHESIA (OUTPATIENT)
Dept: SURGERY | Facility: AMBULATORY SURGERY CENTER | Age: 27
End: 2022-10-19
Payer: COMMERCIAL

## 2022-10-19 VITALS
WEIGHT: 180 LBS | SYSTOLIC BLOOD PRESSURE: 117 MMHG | OXYGEN SATURATION: 97 % | DIASTOLIC BLOOD PRESSURE: 84 MMHG | TEMPERATURE: 97 F | HEIGHT: 63 IN | BODY MASS INDEX: 31.89 KG/M2 | HEART RATE: 94 BPM | RESPIRATION RATE: 15 BRPM

## 2022-10-19 DIAGNOSIS — N62 MACROMASTIA: ICD-10-CM

## 2022-10-19 LAB
HCG UR QL: NEGATIVE
INTERNAL QC OK POCT: NORMAL
POCT KIT EXPIRATION DATE: NORMAL
POCT KIT LOT NUMBER: NORMAL

## 2022-10-19 PROCEDURE — 19318 BREAST REDUCTION: CPT | Mod: 50 | Performed by: PLASTIC SURGERY

## 2022-10-19 PROCEDURE — 88305 TISSUE EXAM BY PATHOLOGIST: CPT | Mod: 26 | Performed by: PATHOLOGY

## 2022-10-19 PROCEDURE — 81025 URINE PREGNANCY TEST: CPT | Performed by: PATHOLOGY

## 2022-10-19 PROCEDURE — 88305 TISSUE EXAM BY PATHOLOGIST: CPT | Mod: TC | Performed by: PLASTIC SURGERY

## 2022-10-19 PROCEDURE — 19318 BREAST REDUCTION: CPT | Mod: 50

## 2022-10-19 RX ORDER — PROPOFOL 10 MG/ML
INJECTION, EMULSION INTRAVENOUS PRN
Status: DISCONTINUED | OUTPATIENT
Start: 2022-10-19 | End: 2022-10-19

## 2022-10-19 RX ORDER — NALOXONE HYDROCHLORIDE 0.4 MG/ML
0.4 INJECTION, SOLUTION INTRAMUSCULAR; INTRAVENOUS; SUBCUTANEOUS
Status: DISCONTINUED | OUTPATIENT
Start: 2022-10-19 | End: 2022-10-20 | Stop reason: HOSPADM

## 2022-10-19 RX ORDER — HYDROMORPHONE HYDROCHLORIDE 1 MG/ML
0.2 INJECTION, SOLUTION INTRAMUSCULAR; INTRAVENOUS; SUBCUTANEOUS EVERY 5 MIN PRN
Status: DISCONTINUED | OUTPATIENT
Start: 2022-10-19 | End: 2022-10-20 | Stop reason: HOSPADM

## 2022-10-19 RX ORDER — NALOXONE HYDROCHLORIDE 0.4 MG/ML
0.2 INJECTION, SOLUTION INTRAMUSCULAR; INTRAVENOUS; SUBCUTANEOUS
Status: DISCONTINUED | OUTPATIENT
Start: 2022-10-19 | End: 2022-10-20 | Stop reason: HOSPADM

## 2022-10-19 RX ORDER — FENTANYL CITRATE 50 UG/ML
25 INJECTION, SOLUTION INTRAMUSCULAR; INTRAVENOUS EVERY 5 MIN PRN
Status: DISCONTINUED | OUTPATIENT
Start: 2022-10-19 | End: 2022-10-20 | Stop reason: HOSPADM

## 2022-10-19 RX ORDER — SODIUM CHLORIDE, SODIUM LACTATE, POTASSIUM CHLORIDE, CALCIUM CHLORIDE 600; 310; 30; 20 MG/100ML; MG/100ML; MG/100ML; MG/100ML
INJECTION, SOLUTION INTRAVENOUS CONTINUOUS
Status: DISCONTINUED | OUTPATIENT
Start: 2022-10-19 | End: 2022-10-19 | Stop reason: HOSPADM

## 2022-10-19 RX ORDER — ONDANSETRON 2 MG/ML
4 INJECTION INTRAMUSCULAR; INTRAVENOUS EVERY 30 MIN PRN
Status: DISCONTINUED | OUTPATIENT
Start: 2022-10-19 | End: 2022-10-20 | Stop reason: HOSPADM

## 2022-10-19 RX ORDER — CEFAZOLIN SODIUM 2 G/50ML
2 SOLUTION INTRAVENOUS SEE ADMIN INSTRUCTIONS
Status: DISCONTINUED | OUTPATIENT
Start: 2022-10-19 | End: 2022-10-19 | Stop reason: HOSPADM

## 2022-10-19 RX ORDER — KETAMINE HYDROCHLORIDE 10 MG/ML
INJECTION INTRAMUSCULAR; INTRAVENOUS PRN
Status: DISCONTINUED | OUTPATIENT
Start: 2022-10-19 | End: 2022-10-19

## 2022-10-19 RX ORDER — ONDANSETRON 2 MG/ML
INJECTION INTRAMUSCULAR; INTRAVENOUS PRN
Status: DISCONTINUED | OUTPATIENT
Start: 2022-10-19 | End: 2022-10-19

## 2022-10-19 RX ORDER — SODIUM CHLORIDE, SODIUM LACTATE, POTASSIUM CHLORIDE, CALCIUM CHLORIDE 600; 310; 30; 20 MG/100ML; MG/100ML; MG/100ML; MG/100ML
INJECTION, SOLUTION INTRAVENOUS CONTINUOUS PRN
Status: DISCONTINUED | OUTPATIENT
Start: 2022-10-19 | End: 2022-10-19

## 2022-10-19 RX ORDER — DEXAMETHASONE SODIUM PHOSPHATE 4 MG/ML
INJECTION, SOLUTION INTRA-ARTICULAR; INTRALESIONAL; INTRAMUSCULAR; INTRAVENOUS; SOFT TISSUE PRN
Status: DISCONTINUED | OUTPATIENT
Start: 2022-10-19 | End: 2022-10-19

## 2022-10-19 RX ORDER — CEFAZOLIN SODIUM 2 G/50ML
2 SOLUTION INTRAVENOUS
Status: DISCONTINUED | OUTPATIENT
Start: 2022-10-19 | End: 2022-10-19 | Stop reason: HOSPADM

## 2022-10-19 RX ORDER — LIDOCAINE HYDROCHLORIDE 20 MG/ML
INJECTION, SOLUTION INFILTRATION; PERINEURAL PRN
Status: DISCONTINUED | OUTPATIENT
Start: 2022-10-19 | End: 2022-10-19

## 2022-10-19 RX ORDER — ENOXAPARIN SODIUM 100 MG/ML
40 INJECTION SUBCUTANEOUS
Status: COMPLETED | OUTPATIENT
Start: 2022-10-19 | End: 2022-10-19

## 2022-10-19 RX ORDER — OXYCODONE HYDROCHLORIDE 5 MG/1
5 TABLET ORAL EVERY 4 HOURS PRN
Status: DISCONTINUED | OUTPATIENT
Start: 2022-10-19 | End: 2022-10-20 | Stop reason: HOSPADM

## 2022-10-19 RX ORDER — SODIUM CHLORIDE, SODIUM LACTATE, POTASSIUM CHLORIDE, CALCIUM CHLORIDE 600; 310; 30; 20 MG/100ML; MG/100ML; MG/100ML; MG/100ML
INJECTION, SOLUTION INTRAVENOUS CONTINUOUS
Status: DISCONTINUED | OUTPATIENT
Start: 2022-10-19 | End: 2022-10-20 | Stop reason: HOSPADM

## 2022-10-19 RX ORDER — FENTANYL CITRATE 50 UG/ML
INJECTION, SOLUTION INTRAMUSCULAR; INTRAVENOUS PRN
Status: DISCONTINUED | OUTPATIENT
Start: 2022-10-19 | End: 2022-10-19

## 2022-10-19 RX ORDER — LIDOCAINE 40 MG/G
CREAM TOPICAL
Status: DISCONTINUED | OUTPATIENT
Start: 2022-10-19 | End: 2022-10-19 | Stop reason: HOSPADM

## 2022-10-19 RX ORDER — OXYCODONE HYDROCHLORIDE 5 MG/1
5-10 TABLET ORAL EVERY 6 HOURS PRN
Qty: 25 TABLET | Refills: 0 | Status: SHIPPED | OUTPATIENT
Start: 2022-10-19 | End: 2022-10-26

## 2022-10-19 RX ORDER — FENTANYL CITRATE 50 UG/ML
25 INJECTION, SOLUTION INTRAMUSCULAR; INTRAVENOUS
Status: DISCONTINUED | OUTPATIENT
Start: 2022-10-19 | End: 2022-10-20 | Stop reason: HOSPADM

## 2022-10-19 RX ORDER — FENTANYL CITRATE 50 UG/ML
25-50 INJECTION, SOLUTION INTRAMUSCULAR; INTRAVENOUS
Status: DISCONTINUED | OUTPATIENT
Start: 2022-10-19 | End: 2022-10-19 | Stop reason: HOSPADM

## 2022-10-19 RX ORDER — ONDANSETRON 4 MG/1
4 TABLET, ORALLY DISINTEGRATING ORAL EVERY 30 MIN PRN
Status: DISCONTINUED | OUTPATIENT
Start: 2022-10-19 | End: 2022-10-20 | Stop reason: HOSPADM

## 2022-10-19 RX ORDER — DEXMEDETOMIDINE HYDROCHLORIDE 4 UG/ML
INJECTION, SOLUTION INTRAVENOUS PRN
Status: DISCONTINUED | OUTPATIENT
Start: 2022-10-19 | End: 2022-10-19

## 2022-10-19 RX ORDER — BUPIVACAINE HYDROCHLORIDE 2.5 MG/ML
INJECTION, SOLUTION EPIDURAL; INFILTRATION; INTRACAUDAL
Status: COMPLETED | OUTPATIENT
Start: 2022-10-19 | End: 2022-10-19

## 2022-10-19 RX ORDER — PROPOFOL 10 MG/ML
INJECTION, EMULSION INTRAVENOUS CONTINUOUS PRN
Status: DISCONTINUED | OUTPATIENT
Start: 2022-10-19 | End: 2022-10-19

## 2022-10-19 RX ORDER — ONDANSETRON 4 MG/1
4 TABLET, ORALLY DISINTEGRATING ORAL EVERY 8 HOURS PRN
Qty: 4 TABLET | Refills: 0 | Status: SHIPPED | OUTPATIENT
Start: 2022-10-19 | End: 2022-10-28

## 2022-10-19 RX ORDER — AMOXICILLIN 250 MG
1-2 CAPSULE ORAL 2 TIMES DAILY
Qty: 30 TABLET | Refills: 0 | Status: SHIPPED | OUTPATIENT
Start: 2022-10-19 | End: 2022-10-28

## 2022-10-19 RX ORDER — ACETAMINOPHEN 325 MG/1
975 TABLET ORAL ONCE
Status: COMPLETED | OUTPATIENT
Start: 2022-10-19 | End: 2022-10-19

## 2022-10-19 RX ORDER — FLUMAZENIL 0.1 MG/ML
0.2 INJECTION, SOLUTION INTRAVENOUS
Status: DISCONTINUED | OUTPATIENT
Start: 2022-10-19 | End: 2022-10-19 | Stop reason: HOSPADM

## 2022-10-19 RX ORDER — CEFAZOLIN SODIUM 2 G/50ML
2 SOLUTION INTRAVENOUS
Status: COMPLETED | OUTPATIENT
Start: 2022-10-19 | End: 2022-10-19

## 2022-10-19 RX ORDER — MEPERIDINE HYDROCHLORIDE 25 MG/ML
12.5 INJECTION INTRAMUSCULAR; INTRAVENOUS; SUBCUTANEOUS
Status: DISCONTINUED | OUTPATIENT
Start: 2022-10-19 | End: 2022-10-20 | Stop reason: HOSPADM

## 2022-10-19 RX ORDER — GABAPENTIN 300 MG/1
300 CAPSULE ORAL
Status: COMPLETED | OUTPATIENT
Start: 2022-10-19 | End: 2022-10-19

## 2022-10-19 RX ADMIN — DEXAMETHASONE SODIUM PHOSPHATE 4 MG: 4 INJECTION, SOLUTION INTRA-ARTICULAR; INTRALESIONAL; INTRAMUSCULAR; INTRAVENOUS; SOFT TISSUE at 12:50

## 2022-10-19 RX ADMIN — PROPOFOL 200 MG: 10 INJECTION, EMULSION INTRAVENOUS at 12:50

## 2022-10-19 RX ADMIN — Medication 100 MCG: at 14:10

## 2022-10-19 RX ADMIN — FENTANYL CITRATE 50 MCG: 50 INJECTION, SOLUTION INTRAMUSCULAR; INTRAVENOUS at 13:15

## 2022-10-19 RX ADMIN — SODIUM CHLORIDE, SODIUM LACTATE, POTASSIUM CHLORIDE, CALCIUM CHLORIDE: 600; 310; 30; 20 INJECTION, SOLUTION INTRAVENOUS at 14:06

## 2022-10-19 RX ADMIN — ENOXAPARIN SODIUM 40 MG: 100 INJECTION SUBCUTANEOUS at 12:31

## 2022-10-19 RX ADMIN — ONDANSETRON 4 MG: 2 INJECTION INTRAMUSCULAR; INTRAVENOUS at 14:52

## 2022-10-19 RX ADMIN — Medication 200 MCG: at 14:00

## 2022-10-19 RX ADMIN — Medication 100 MCG: at 13:39

## 2022-10-19 RX ADMIN — LIDOCAINE HYDROCHLORIDE 100 MG: 20 INJECTION, SOLUTION INFILTRATION; PERINEURAL at 12:50

## 2022-10-19 RX ADMIN — Medication 100 MCG: at 13:49

## 2022-10-19 RX ADMIN — Medication 100 MCG: at 14:15

## 2022-10-19 RX ADMIN — FENTANYL CITRATE 25 MCG: 50 INJECTION, SOLUTION INTRAMUSCULAR; INTRAVENOUS at 15:26

## 2022-10-19 RX ADMIN — KETAMINE HYDROCHLORIDE 20 MG: 10 INJECTION INTRAMUSCULAR; INTRAVENOUS at 13:32

## 2022-10-19 RX ADMIN — FENTANYL CITRATE 50 MCG: 50 INJECTION, SOLUTION INTRAMUSCULAR; INTRAVENOUS at 12:22

## 2022-10-19 RX ADMIN — SODIUM CHLORIDE, SODIUM LACTATE, POTASSIUM CHLORIDE, CALCIUM CHLORIDE: 600; 310; 30; 20 INJECTION, SOLUTION INTRAVENOUS at 12:44

## 2022-10-19 RX ADMIN — SODIUM CHLORIDE, SODIUM LACTATE, POTASSIUM CHLORIDE, CALCIUM CHLORIDE: 600; 310; 30; 20 INJECTION, SOLUTION INTRAVENOUS at 11:58

## 2022-10-19 RX ADMIN — DEXMEDETOMIDINE HYDROCHLORIDE 4 MCG: 4 INJECTION, SOLUTION INTRAVENOUS at 13:14

## 2022-10-19 RX ADMIN — FENTANYL CITRATE 25 MCG: 50 INJECTION, SOLUTION INTRAMUSCULAR; INTRAVENOUS at 15:33

## 2022-10-19 RX ADMIN — FENTANYL CITRATE 50 MCG: 50 INJECTION, SOLUTION INTRAMUSCULAR; INTRAVENOUS at 13:18

## 2022-10-19 RX ADMIN — Medication 100 MCG: at 13:51

## 2022-10-19 RX ADMIN — Medication 100 MCG: at 13:29

## 2022-10-19 RX ADMIN — FENTANYL CITRATE 25 MCG: 50 INJECTION, SOLUTION INTRAMUSCULAR; INTRAVENOUS at 15:38

## 2022-10-19 RX ADMIN — BUPIVACAINE HYDROCHLORIDE 20 ML: 2.5 INJECTION, SOLUTION EPIDURAL; INFILTRATION; INTRACAUDAL at 12:22

## 2022-10-19 RX ADMIN — ACETAMINOPHEN 975 MG: 325 TABLET ORAL at 11:41

## 2022-10-19 RX ADMIN — GABAPENTIN 300 MG: 300 CAPSULE ORAL at 11:41

## 2022-10-19 RX ADMIN — PROPOFOL 175 MCG/KG/MIN: 10 INJECTION, EMULSION INTRAVENOUS at 12:50

## 2022-10-19 RX ADMIN — OXYCODONE HYDROCHLORIDE 5 MG: 5 TABLET ORAL at 15:26

## 2022-10-19 RX ADMIN — DEXMEDETOMIDINE HYDROCHLORIDE 12 MCG: 4 INJECTION, SOLUTION INTRAVENOUS at 12:47

## 2022-10-19 RX ADMIN — CEFAZOLIN SODIUM 2 G: 2 SOLUTION INTRAVENOUS at 12:50

## 2022-10-19 ASSESSMENT — COPD QUESTIONNAIRES: COPD: 0

## 2022-10-19 ASSESSMENT — ENCOUNTER SYMPTOMS: ORTHOPNEA: 0

## 2022-10-19 ASSESSMENT — LIFESTYLE VARIABLES: TOBACCO_USE: 0

## 2022-10-19 NOTE — OP NOTE
PREOPERATIVE DIAGNOSIS: Symptomatic bilateral breast hypertrophy.      POSTOPERATIVE DIAGNOSIS: Symptomatic bilateral breast hypertrophy.      PROCEDURES: Bilateral superomedial pedicle inverted T skin closure breast reduction.      SURGEON: Alena Govea MD.      RESIDENT: Niall Zabala MD.     ANESTHESIA: General anesthesia with endotracheal intubation.      COMPLICATIONS: Nil.      DRAINS: Nil.      Blood Loss: 250 mL     SPECIMENS: Skin and breast tissue from right breast measuring about 832 g, left breast measuring about 1001 g.      DESCRIPTION OF PROCEDURE: After informed consent was taken, the proper site and procedure was ascertained with the patient and was appropriately marked and taken to in the operating room.  She was placed in supine position with the knees comfortably flexed with pillows underneath them, and pneumoboots placed and running prior to induction of anesthesia. Preoperative antibiotics given in the OR. All pressure points were appropriately padded. General anesthesia was administered without any complications. She was placed in such a position that she could be flexed to about 50 degrees. Her arms were padded and abducted to about 50 degrees. She was prepped and draped in a standard surgical fashion. I began by first remarking the preop markings and marking a 42 mm areola on each side. I then marked out a superior medial pedicle on each side. I then de-epithelialized the pedicle on each side. I then dissected out the pedicle on each side without actually seeing the deep fascia and also released the pedicle such that it could rotate into its new nipple position without any tension. I then went ahead and on each side excised the inferomedial, inferior, inferolateral and lateral aspects of the breast according to a vertical pattern reduction. Strict hemostasis was ensured during this entire part of the case. Once this was done, I then temporarily closed the patient's breast in an inverted T  fashion, sat the patient up ensured symmetry and then marked out the new areolar opening symmetrically on each side. I then went ahead and closed the horizontal incision using 2-0 Monocryl suture in a deep dermal layer. Then I made sure that the nipple areolar complex could be retrieved without any tension, which is could on each side. I then checked that they were both pink and viable, which they were. I then went ahead and excised the skin of the new areolar opening and de-epithelialized epithelialized the portion that was involved in the pedicle on each side. I then sutured in the nipple areolar complex using 2-0 Monocryl suture in a deep dermal fashion circumferentially. I then closed the vertical incision using 2-0 Monocryl suture to approximate the medial and lateral pillars, and then the deep dermis. I then ran all the incisions with 3-0 Strattafix suture in a running intracuticular manner followed by placement of Prineo, and then followed by an ACE wrap. At the end of the case, the patient's breasts were soft, nipples were pink, and breasts were symmetric. The patient tolerated the procedure well. All counts correct at the end of the case. The patient was extubated and sent to recovery room in a stable condition.

## 2022-10-19 NOTE — DISCHARGE INSTRUCTIONS
BREAST REDUCTION POST-OPERATIVE INSTRUCTIONS    Instructions      Have someone drive you home after surgery and help you at home for 1-2      days.     Get plenty of rest.     Follow balanced diet.     Decreased activity may promote constipation, so you may want to add      more raw fruit to your diet, and be sure to increase fluid intake. Your doctor       may also order a stool softener.     Do not drink alcohol when taking pain medications.     If you are taking vitamins with iron, resume these as tolerated.     Do not smoke, as smoking delays healing and increases the risk of      Complications.    Medications      Please take stool softeners while taking narcotic medications to prevent constipation       You may take tylenol or ibuprofen (eg other NSAIDS) after surgery instead of or in addition to the prescribed narcotic pain medications.     Activities     Do not drive while taking narcotic pain medications and while experiencing any pain.      Do not drive until you have full range of motion with your arms and can stop the car       or swerve in an emergency.     Start walking the evening of surgery, this helps to reduce swelling and       lowers the chance of blood clots.     Refrain from vigorous activities for 2-6 weeks.  Increase activity gradually as tolerated.      After 2 weeks, you may perform lower body exercise, but must wait the full 6 weeks       prior to performing upper body exercise     Avoid lifting anything over 5 pounds for 2 weeks.     Resume social and employment activities in about 2 weeks (if not too strenuous).    Incision Care     You may shower the next day after surgery. The ACE wrap (if used) may be rewrapped as needed (if too tight or loose). Use it for support and may subtitute with a sports bra if preferred.      Avoid exposing scars to sun for at least 12 months.     Always use a strong sunblock, if sun exposure is unavoidable (SPF 50 or      greater).     Keep the tape on  "incisions until it starts to curl up on the ends, and then gently remove them.        You may use moisturizing cream (eg Aquaphor or Vaseline) at 10 days to help the tape come off. By two weeks,      you may pull off the tape off the incisions if still in place.     Wear your surgical bra/wrap 24/7 as directed for 4 weeks.     Avoid bras with stays and underwires for 4-6 weeks.     You may pad the incisions with gauze for comfort (panty liners also work well as they        are absorbent and inexpensive).     Inspect daily for signs of infection.     No tub soaking, bathing, or swimming until wounds are healed.     If your breast skin is dry after surgery, you can apply a moisturizer several times a       day.     What to Expect     Despite layered closing of your incisions, there will be some oozing       of tissue fluid from incision sites. This is expected, especially for the first 2 days or so.  This will soak up on the gauze and the bra       to look like it is more than it really is. If the draining continues past 2 days, with pain, discomfort, and large volumes, please call the clinic.      Most of the higher discomfort will subside after the first few days.     You may experience temporary soreness, bruising, swelling and tightness in the       breasts as well as discomfort in the incision area.     You may have have normal sensation in the nipples. This may be more or less than usual, and usually returns over a couple of months.     Your first menstruation following surgery may cause your breasts to swell and hurt.     You may have random shooting pains, tingling, or other strange sensations in the skin for a few months. These will subside.    Appearance     Most of the discoloration and swelling will subside in 2-4 weeks.     Your breasts will feel firm to the touch initially, but will soften with time.     A more natural shape will occur as the breasts \"settle\" in a slightly lower position over the first " few months.     Scars may be red and thick for 6-12 months (longer in lighter-skinned patients).  In time, these usually soften and fade.    Follow-Up Care     Typically, you will have a post op check at 1-2 weeks, and again with your surgeon in another month.    When to Call     If you have increased swelling or bruising, particular one side greater than the other.     If swelling and redness persist after a few days.     If you have increased redness along the incision.     If you have severe or increased pain not relieved by medication.     If you have any side effects to medications; such as, rash, nausea,      headache, vomiting, or constipation.     If you have an oral temperature over 100.4 degrees.     If you have any yellowish or greenish drainage from the incisions or      notice a foul odor.     If you have bleeding from the incisions that is difficult to control with      light pressure.     If you have loss of feeling or motion.     Any unanswered concern.    For Medical Questions, Please Call:     [phone], Monday - Friday, 8 a.m. - 4:30 p.m.     After hours and on weekends, call Hospital Paging at [phone] and      ask for the Plastic Surgeon on call.      [facility]  Home Care Following Anesthesia  For 24 hours after surgery:  Get plenty of rest.  A responsible adult must stay with you for at least 24 hours after you leave the surgery center.  Do not drive or use heavy equipment.  If you have weakness or tingling, don't drive or use heavy equipment until this feeling goes away.   Do not drink alcohol.   Avoid strenuous or risky activities.  Ask for help when climbing stairs.  You may feel lightheaded.  IF so, sit for a few minutes before standing.  Have someone help you get up.   If you have nausea (feel sick to your stomach): Drink only clear liquids such as apple juice, ginger ale, broth or 7-Up.  Rest may also help.  Be sure to drink enough fluids.   "Move to a regular diet as you feel able.   You may have a slight fever.  Call the doctor if your fever is over 100 F (37.7 C) (taken under the tongue) or lasts longer than 24 hours.  You may have a dry mouth, a sore throat, muscle aches or trouble sleeping. These should go away after 24 hours.  Do not make important or legal decisions.   It is recommended to avoid smoking.        Today you received an Exparel block to numb the nerves near your surgery site.  This is a block using local anesthetic or \"numbing\" medication injected around the nerves to anesthetize or \"numb\" the area supplied by those nerves.  This block is injected into the muscle layer near your surgical site.  This medication may numb the location where you had surgery up to 72 hours.  If your surgical site is an arm or leg you should be careful with your affected limb, since it is possible to injure your limb without being aware of it due to the numbing.  Until full feeling returns, you should guard against bumping or hitting your limb, and avoid extreme hot or cold temperatures on the skin.  As the block wears off, the feeling will return as a tingling or prickly sensation near your surgical site.  You will experince more discomfort from your incision as the feeling returns.  You may want to take a pain pill (a narcotic or Tylenol if this was prescribed by your surgeon) when you start to experience mild pain before the pain beomes more severe.  If your pain medications do not control your pain, you should notify your surgeon.    Tips for taking pain medications  To get the best pain relief possible, remember these points:  Take pain medications as directed, before pain becomes severe.  Pain medication can upset your stomach: taking it with food may help.  Constipation is a common side effect of pain medication. Drink plenty of  fluids.  Eat foods high in fiber. Take a stool softener if recommended by your doctor or pharmacist.  Do not drink " alcohol, drive or operate machinery while taking pain medications.  Ask about other ways to control pain, such as with heat, ice or relaxation.    Tylenol/Acetaminophen Consumption  To help encourage the safe use of acetaminophen, the makers of TYLENOL  have lowered the maximum daily dose for single-ingredient Extra Strength TYLENOL  (acetaminophen) products sold in the [place] from 8 pills per day (4,000 mg) to 6 pills per day (3,000 mg). The dosing interval has also changed from 2 pills every 4-6 hours to 2 pills every 6 hours.  If you feel your pain relief is insufficient, you may take Tylenol/Acetaminophen in addition to your narcotic pain medication.   Be careful not to exceed 3,000 mg of Tylenol/Acetaminophen in a 24 hour period from all sources.  If you are taking extra strength Tylenol/acetaminophen (500 mg), the maximum dose is 6 tablets in 24 hours.  If you are taking regular strength acetaminophen (325 mg), the maximum dose is 9 tablets in 24 hours.  You received 975 mg of Tylenol at 11:40 pm.  Your next dose is available after 5:40 pm.  Then follow the directions on the bottle.    Call a doctor for any of the following:  Signs of infection (fever, growing tenderness at the surgery site, a large amount of drainage or bleeding, severe pain, foul-smelling drainage, redness, swelling).  It has been over 8 to 10 hours since surgery and you are still not able to urinate (pass water).  Headache for over 24 hours.  Signs of Covid-19 infection (temperature over 100 degrees, shortness of breath, cough, loss of taste/smell, generalized body aches, persistent headache, chills, sore throat, nausea/vomiting/diarrhea)  Your doctor is:    Dr. Alena Govea, Plastic Surgery: [phone]  After Hours and Weekends dial: [phone] and ask for the resident on call for:  Plastics  For emergency care, call the:  [facility]:  [phone] (TTY for hearing impaired: [phone])

## 2022-10-19 NOTE — ANESTHESIA POSTPROCEDURE EVALUATION
Patient: Freida Aguilar    Procedure: Procedure(s):  MAMMOPLASTY, REDUCTION, BILATERAL       Anesthesia Type:  General    Note:  Disposition: Outpatient   Postop Pain Control: Uneventful            Sign Out: Well controlled pain   PONV: No   Neuro/Psych: Uneventful            Sign Out: Acceptable/Baseline neuro status   Airway/Respiratory: Uneventful            Sign Out: Acceptable/Baseline resp. status   CV/Hemodynamics: Uneventful            Sign Out: Acceptable CV status; No obvious hypovolemia; No obvious fluid overload   Other NRE: NONE   DID A NON-ROUTINE EVENT OCCUR? No           Last vitals:  Vitals Value Taken Time   /77 10/19/22 1542   Temp 36.8  C (98.2  F) 10/19/22 1542   Pulse 104 10/19/22 1542   Resp 12 10/19/22 1542   SpO2 95 % 10/19/22 1541   Vitals shown include unvalidated device data.    Electronically Signed By: Kyle Conner MD, MD  October 19, 2022  4:34 PM

## 2022-10-19 NOTE — OR NURSING
Patient received bilateral Pectoralis nerve block  with Exparel.    Fentanyl 50 mcg and Versed 2 mg given. Tolerated procedure well.

## 2022-10-19 NOTE — ANESTHESIA PREPROCEDURE EVALUATION
Anesthesia Pre-Procedure Evaluation    Patient: Freida Aguilar   MRN: [id number] : 1995        Procedure : Procedure(s):  MAMMOPLASTY, REDUCTION, BILATERAL          Past Medical History:   Diagnosis Date     Acne      Acute deep vein thrombosis (DVT) of lower extremity (H)     while on OCPs     May-Thurner syndrome       Past Surgical History:   Procedure Laterality Date     COLONOSCOPY N/A 2021    Procedure: COLONOSCOPY;  Surgeon: Dionne Soliz MD;  Location: [facility] OR     ESOPHAGOSCOPY, GASTROSCOPY, DUODENOSCOPY (EGD), COMBINED N/A 2021    Procedure: ESOPHAGOGASTRODUODENOSCOPY, WITH BIOPSY;  Surgeon: Dionne Soliz MD;  Location: [facility] OR     HEMORRHOID SURGERY       IR STENT VASCULAR LT Left      WISDOM TOOTH EXTRACTION Bilateral       Allergies   Allergen Reactions     Clopidogrel Hives, Itching and Swelling     Rivaroxaban Hives and Itching      Social History     Tobacco Use     Smoking status: Never     Smokeless tobacco: Never   Substance Use Topics     Alcohol use: Not Currently      Wt Readings from Last 1 Encounters:   10/19/22 81.6 kg (180 lb)        Anesthesia Evaluation   Pt has had prior anesthetic. Type: MAC.    History of anesthetic complications   Patient has anxiety 2/2 waking up during wisdom tooth & colonoscopy procedures.    ROS/MED HX  ENT/Pulmonary:    (-) tobacco use, asthma, COPD and recent URI   Neurologic:       Cardiovascular:    (-) VILLEDA, orthopnea/PND and syncope   METS/Exercise Tolerance: >4 METS    Hematologic: Comments: Hx DVT/PE in 2016 2/2 May Thurner anatomy & OCP. S/p L iliac stent. Seen by Vascular and ok to proceed with surgery today.     (+) History of blood clots, pt is not anticoagulated,     Musculoskeletal:       GI/Hepatic:    (-) GERD   Renal/Genitourinary:       Endo:     (+) Obesity,     Psychiatric/Substance Use:       Infectious Disease:       Malignancy:       Other:     (-) Any chance pregnant       Physical Exam    Airway         Mallampati: I   TM distance: > 3 FB   Neck ROM: full   Mouth opening: > 3 cm    Respiratory Devices and Support         Dental  no notable dental history         Cardiovascular          Rhythm and rate: regular and normal     Pulmonary           breath sounds clear to auscultation           OUTSIDE LABS:  CBC:   Lab Results   Component Value Date    WBC 7.6 04/26/2021    HGB 13.7 04/26/2021    HCT 39.8 04/26/2021     04/26/2021     BMP:   Lab Results   Component Value Date     04/26/2021    POTASSIUM 4.3 04/26/2021    CHLORIDE 105 04/26/2021    CO2 29 04/26/2021    BUN 11 04/26/2021    CR 0.87 04/26/2021    GLC 82 04/26/2021     COAGS: No results found for: PTT, INR, FIBR  POC:   Lab Results   Component Value Date    HCG Negative 10/19/2022     HEPATIC:   Lab Results   Component Value Date    ALBUMIN 4.2 04/26/2021    PROTTOTAL 7.8 04/26/2021    ALT 47 04/26/2021    AST 22 04/26/2021    ALKPHOS 46 04/26/2021    BILITOTAL 0.6 04/26/2021     OTHER:   Lab Results   Component Value Date    BIRGIT 9.0 04/26/2021    TSH 3.16 04/26/2021       Anesthesia Plan    ASA Status:  2   NPO Status:  NPO Appropriate    Anesthesia Type: General.     - Airway: LMA   Induction: Intravenous.   Maintenance: Balanced.        Consents    Anesthesia Plan(s) and associated risks, benefits, and realistic alternatives discussed. Questions answered and patient/representative(s) expressed understanding.    - Discussed:     - Discussed with:  Patient         Postoperative Care    Pain management: IV analgesics, Oral pain medications, Peripheral nerve block (Single Shot).   PONV prophylaxis: Ondansetron (or other 5HT-3), Dexamethasone or Solumedrol, Background Propofol Infusion     Comments:    Other Comments: Discussed risks of general anesthesia, including aspiration pneumonia, sore throat/hoarse voice, abrasions/damage to lips/tongue/teeth, nausea, rare complications (including medication reactions, cardiac, pulmonary, hypoxia/low  oxygen, recall). Ensured understanding, invited questions and all questions were answered. Patient wishes to proceed.    Preoperative bilateral pectoralis (PECS 1&2) nerve block with liposomal bupivacaine, per surgeon request. Discussed risks of nerve block, including nerve injury, bleeding, infection, incomplete analgesia, lung injury. Discussed alternative of not performing a nerve block. Ensured understanding, invited questions and all questions were answered. Patient wishes to proceed.       H&P reviewed: Unable to attach H&P to encounter due to EHR limitations. H&P Update: appropriate H&P reviewed, patient examined. No interval changes since H&P (within 30 days).         Zonia Jefferson MD

## 2022-10-19 NOTE — ANESTHESIA PROCEDURE NOTES
Pectoralis Procedure Note    Pre-Procedure   Staff -        Anesthesiologist:  Zonia Jefferson MD       Performed By: anesthesiologist       Location: pre-op       Procedure Start/Stop Times: 10/19/2022 12:22 PM and 10/19/2022 12:28 PM       Pre-Anesthestic Checklist: patient identified, IV checked, site marked, risks and benefits discussed, informed consent, monitors and equipment checked, pre-op evaluation, at physician/surgeon's request and post-op pain management  Timeout:       Correct Patient: Yes        Correct Procedure: Yes        Correct Site: Yes        Correct Position: Yes        Correct Laterality: Yes        Site Marked: Yes  Procedure Documentation  Procedure: Pectoralis             Pectoralis I and Pectoralis II       Diagnosis: POST OPERATIVE PAIN       Laterality: bilateral       Patient Position: supine       Patient Prep/Sterile Barriers: sterile gloves, mask       Skin prep: Chloraprep       Needle Type: insulated and short bevel       Needle Gauge: 21.        Needle Length (millimeters): 110        Ultrasound guided       1. Ultrasound was used to identify targeted nerve, plexus, vascular marker, or fascial plane and place a needle adjacent to it in real-time.       2. Ultrasound was used to visualize the spread of anesthetic in close proximity to the above referenced structure.       3. A permanent image is entered into the patient's record.    Assessment/Narrative         The placement was negative for: blood aspirated, painful injection and site bleeding       Paresthesias: No.       Bolus given via needle..        Secured via.        Insertion/Infusion Method: Single Shot       Complications: none       Injection made incrementally with aspirations every 5 mL.    Medication(s) Administered   Bupivacaine 0.25% PF (Infiltration) - Infiltration   20 mL - 10/19/2022 12:22:00 PM  Bupivacaine liposome (Exparel) 1.3% LA inj susp (Infiltration) - Infiltration   20 mL - 10/19/2022 12:22:00  "PM  Medication Administration Time: 10/19/2022 12:22 PM     Comments:  266mg liposomal bupivacaine via bilateral pectoralis block.    Discussed risks of nerve block, including nerve injury, bleeding, infection, incomplete analgesia, lung injury. Discussed alternative of not performing a nerve block. Ensured understanding, invited questions and all questions were answered. Patient wishes to proceed.    Informed consent was obtained.   Patient tolerated well. Incremental aspiration every 5 mL. No paresthesia, no heme. Needle tip visualized throughout with appropriate spread of local anesthetic in fascial planes. No evidence of pneumothorax: post-block ultrasound with lung sliding at most superior aspect.     Block was placed at the surgeon's request for post operative pain control.          FOR [facility] [place] ONLY:   Pain Team Contact information: please page the Pain Team Via [email or web address]. Search \"Pain\". During daytime hours, please page the attending first. At night please page the resident first.    "

## 2022-10-19 NOTE — ANESTHESIA CARE TRANSFER NOTE
Patient: Freida Aguilar    Procedure: Procedure(s):  MAMMOPLASTY, REDUCTION, BILATERAL       Diagnosis: Macromastia [N62]  Diagnosis Additional Information: No value filed.    Anesthesia Type:   General     Note:    Oropharynx: spontaneously breathing and oral airway in place  Level of Consciousness: drowsy  Oxygen Supplementation: face mask  Level of Supplemental Oxygen (L/min / FiO2): 6  Independent Airway: airway patency satisfactory and stable  Dentition: dentition unchanged  Vital Signs Stable: post-procedure vital signs reviewed and stable  Report to RN Given: handoff report given  Patient transferred to: PACU    Handoff Report: Identifed the Patient, Identified the Reponsible Provider, Reviewed the pertinent medical history, Discussed the surgical course, Reviewed Intra-OP anesthesia mangement and issues during anesthesia, Set expectations for post-procedure period and Allowed opportunity for questions and acknowledgement of understanding      Vitals:  Vitals Value Taken Time   /83 10/19/22 1507   Temp 36.4  C (97.5  F) 10/19/22 1507   Pulse 75 10/19/22 1514   Resp 14 10/19/22 1514   SpO2 100 % 10/19/22 1514   Vitals shown include unvalidated device data.    Electronically Signed By: HELEN Hightower CRNA  October 19, 2022  3:15 PM

## 2022-10-25 ENCOUNTER — TELEPHONE (OUTPATIENT)
Dept: PLASTIC SURGERY | Facility: CLINIC | Age: 27
End: 2022-10-25

## 2022-10-27 NOTE — PROGRESS NOTES
Plastic Surgery Outpatient Visit    ID: Freida Aguilar is a 27 year old female s/p bilateral breast reduction 10/19/2022. Has concerns about fluid collection.     S: she noticed increased swelling and could feel/hear fluid in the R breast since Saturday. Swelling was stable for a few days and started decreasing yesterday. Can sometimes still feel fluid. Pain manageable with tylenol/ibuprofen. Denies dizziness/lightheadedness.     O:  /78   Pulse 109   Temp 98.9  F (37.2  C) (Oral)   LMP 08/31/2022   SpO2 97%    General: NAD  Chest: R breast incisions c/d/i with tape intact. Nipple pink, viable. Moderate ecchymosis to inferior and lateral pole. Skin viable. Breast slightly more swollen than contralateral breast. L breast incisions c/d/i with tape intact. Nipple pink, viable. Mild/moderate lower pole ecchymosis.     A/P:  -R breast with significant ecchymosis and mild swelling, likely hematoma bu appears stable/improving.  -discussed course of hematoma resolving slowly over time, this may take a few months.   -monitor for symtpoms including increased swelling/pain or signs of infection. Call with any concerns  -will see next week for follow up    -discussed with GABY Arreola-C  Plastic and Reconstructive Surgery    15 minutes spent on the date of the encounter doing chart review, history and physical, dressing changes, documentation and further activity as noted above.

## 2022-10-28 ENCOUNTER — OFFICE VISIT (OUTPATIENT)
Dept: PLASTIC SURGERY | Facility: CLINIC | Age: 27
End: 2022-10-28
Payer: COMMERCIAL

## 2022-10-28 VITALS
SYSTOLIC BLOOD PRESSURE: 124 MMHG | TEMPERATURE: 98.9 F | HEART RATE: 109 BPM | DIASTOLIC BLOOD PRESSURE: 78 MMHG | OXYGEN SATURATION: 97 %

## 2022-10-28 DIAGNOSIS — N62 MACROMASTIA: Primary | ICD-10-CM

## 2022-10-28 PROCEDURE — 99024 POSTOP FOLLOW-UP VISIT: CPT | Performed by: PHYSICIAN ASSISTANT

## 2022-10-28 ASSESSMENT — PAIN SCALES - GENERAL: PAINLEVEL: MODERATE PAIN (4)

## 2022-10-28 NOTE — NURSING NOTE
Chief Complaint   Patient presents with     Surgical Followup     PO DOS 10/19 -- BRM       Vitals:    10/28/22 1139   BP: 124/78   Pulse: 109   Temp: 98.9  F (37.2  C)   TempSrc: Oral   SpO2: 97%       There is no height or weight on file to calculate BMI.          MARY JANE MARCUS EMT

## 2022-10-28 NOTE — LETTER
10/28/2022       RE: Freida Aguilar  [address]  [place] [address]     Dear Colleague,    Thank you for referring your patient, Freida Aguilar, to the [facility] PLASTIC AND RECONSTRUCTIVE SURGERY CLINIC [place] at [facility]. Please see a copy of my visit note below.    Plastic Surgery Outpatient Visit    ID: Freida Aguilar is a 27 year old female s/p bilateral breast reduction 10/19/2022. Has concerns about fluid collection.     S: she noticed increased swelling and could feel/hear fluid in the R breast since Saturday. Swelling was stable for a few days and started decreasing yesterday. Can sometimes still feel fluid. Pain manageable with tylenol/ibuprofen. Denies dizziness/lightheadedness.     O:  /78   Pulse 109   Temp 98.9  F (37.2  C) (Oral)   LMP 08/31/2022   SpO2 97%    General: NAD  Chest: R breast incisions c/d/i with tape intact. Nipple pink, viable. Moderate ecchymosis to inferior and lateral pole. Skin viable. Breast slightly more swollen than contralateral breast. L breast incisions c/d/i with tape intact. Nipple pink, viable. Mild/moderate lower pole ecchymosis.     A/P:  -R breast with significant ecchymosis and mild swelling, likely hematoma bu appears stable/improving.  -discussed course of hematoma resolving slowly over time, this may take a few months.   -monitor for symtpoms including increased swelling/pain or signs of infection. Call with any concerns  -will see next week for follow up    -discussed with Dr. Jeferson Dunn PA-C  Plastic and Reconstructive Surgery    15 minutes spent on the date of the encounter doing chart review, history and physical, dressing changes, documentation and further activity as noted above.

## 2022-10-31 NOTE — PROGRESS NOTES
Plastic Surgery Outpatient Visit    ID: Freida Aguilar is a 27 year old female s/p bilateral breast reduction 10/19/2022, presents for follow up for post op stable hematoma.     S: Doing ok since visit last week. Swelling stable, not feeling tighter. Continues to have some pain to right inferiorlateral breast and lateral chest wall.     O:  /80   Pulse 98   Temp 98.6  F (37  C) (Oral)   SpO2 98%    General: NAD  Chest: right breast with residual ecchymosis to inferior and lateral poles. Moderately more full appearing than left breast. Left breast with mild residual ecchymosis. Nipples intact and viable bilaterally. 1/2 of tape remaining.     A/P:  -doing well, R breast hematoma stable  -ok to moisturize, continue to remove tape  -dressings as needed for comfort and continue compression bra or wrap for compression  -monitor for increased swelling/pain, call with any concerns  -RTC 2 weeks, then 4 weeks with Dr. Govea. Patient agreeable to plan.     Marj Dunn PA-C  Plastic and Reconstructive Surgery    15 minutes spent on the date of the encounter doing chart review, history and physical, dressing changes, documentation and further activity as noted above.

## 2022-11-01 ENCOUNTER — OFFICE VISIT (OUTPATIENT)
Dept: PLASTIC SURGERY | Facility: CLINIC | Age: 27
End: 2022-11-01
Payer: COMMERCIAL

## 2022-11-01 VITALS
TEMPERATURE: 98.6 F | OXYGEN SATURATION: 98 % | DIASTOLIC BLOOD PRESSURE: 80 MMHG | SYSTOLIC BLOOD PRESSURE: 116 MMHG | HEART RATE: 98 BPM

## 2022-11-01 DIAGNOSIS — N62 MACROMASTIA: Primary | ICD-10-CM

## 2022-11-01 PROCEDURE — 99024 POSTOP FOLLOW-UP VISIT: CPT | Performed by: PHYSICIAN ASSISTANT

## 2022-11-01 ASSESSMENT — PAIN SCALES - GENERAL: PAINLEVEL: MILD PAIN (3)

## 2022-11-01 NOTE — NURSING NOTE
Chief Complaint   Patient presents with     RECHECK     Post-op -- DOS 10/19       Vitals:    11/01/22 0908   BP: 116/80   Pulse: 98   Temp: 98.6  F (37  C)   TempSrc: Oral   SpO2: 98%       There is no height or weight on file to calculate BMI.          MARY JANE MARCUS EMT

## 2022-11-01 NOTE — LETTER
11/1/2022       RE: Freida Aguilar  [address]  [place] [address]     Dear Colleague,    Thank you for referring your patient, Freida Aguilar, to the [facility] PLASTIC AND RECONSTRUCTIVE SURGERY CLINIC [place] at [facility]. Please see a copy of my visit note below.    Plastic Surgery Outpatient Visit    ID: Freida Aguilar is a 27 year old female s/p bilateral breast reduction 10/19/2022, presents for follow up for post op stable hematoma.     S: Doing ok since visit last week. Swelling stable, not feeling tighter. Continues to have some pain to right inferiorlateral breast and lateral chest wall.     O:  /80   Pulse 98   Temp 98.6  F (37  C) (Oral)   SpO2 98%    General: NAD  Chest: right breast with residual ecchymosis to inferior and lateral poles. Moderately more full appearing than left breast. Left breast with mild residual ecchymosis. Nipples intact and viable bilaterally. 1/2 of tape remaining.     A/P:  -doing well, R breast hematoma stable  -ok to moisturize, continue to remove tape  -dressings as needed for comfort and continue compression bra or wrap for compression  -monitor for increased swelling/pain, call with any concerns  -RTC 2 weeks, then 4 weeks with Dr. Govea. Patient agreeable to plan.     Marj Dunn PA-C  Plastic and Reconstructive Surgery    15 minutes spent on the date of the encounter doing chart review, history and physical, dressing changes, documentation and further activity as noted above.

## 2022-11-15 ENCOUNTER — OFFICE VISIT (OUTPATIENT)
Dept: PLASTIC SURGERY | Facility: CLINIC | Age: 27
End: 2022-11-15
Payer: COMMERCIAL

## 2022-11-15 VITALS
SYSTOLIC BLOOD PRESSURE: 120 MMHG | HEART RATE: 105 BPM | TEMPERATURE: 98.3 F | DIASTOLIC BLOOD PRESSURE: 75 MMHG | OXYGEN SATURATION: 96 %

## 2022-11-15 DIAGNOSIS — N62 MACROMASTIA: Primary | ICD-10-CM

## 2022-11-15 PROCEDURE — 99024 POSTOP FOLLOW-UP VISIT: CPT | Performed by: PHYSICIAN ASSISTANT

## 2022-11-15 ASSESSMENT — PAIN SCALES - GENERAL: PAINLEVEL: NO PAIN (1)

## 2022-11-15 NOTE — NURSING NOTE
Chief Complaint   Patient presents with     Surgical Followup     2 WEEK FOLLOW UP  DOS 10/19       Vitals:    11/15/22 1001   BP: 120/75   Pulse: 105   Temp: 98.3  F (36.8  C)   TempSrc: Oral   SpO2: 96%       There is no height or weight on file to calculate BMI.          MARY JANE MARCUS EMT

## 2022-11-15 NOTE — LETTER
11/15/2022       RE: Freida Aguilar  [address]  [place] [address]     Dear Colleague,    Thank you for referring your patient, Freida Aguilar, to the [facility] PLASTIC AND RECONSTRUCTIVE SURGERY CLINIC [place] at [facility]. Please see a copy of my visit note below.    Plastic Surgery Outpatient Visit    ID: Freida Aguilar is a 27 year old female s/p bilateral breast reduction 10/19/2022, presents for follow up for post op stable hematoma.     S: doing ok since last visit. No pain. Still notes some swelling and asymmetry. Using neosporin once daily to incisions. Wearing compression bra. Has returned to work at dest job.     O:  /75   Pulse 105   Temp 98.3  F (36.8  C) (Oral)   SpO2 96%    General: NAD  Chest: bilateral chest scars well healed, pink. Nipple intact, viable. R breast overall soft with residual ecchymosis inferior pole, moderate edema to lateral breast. L breast with mild residual ecchymosis and dermal edema to lower pole.     A/P:  -healing well, hematoma resolving  -stop neosporin, start aquafor/plain lotion once daily  -continue compression bra  -RTC with Dr. Govea in 2 weeks    Marj Dunn PA-C  Plastic and Reconstructive Surgery    10 minutes spent on the date of the encounter doing chart review, history and physical, dressing changes, documentation and further activity as noted above.

## 2022-11-15 NOTE — PROGRESS NOTES
Plastic Surgery Outpatient Visit    ID: Freida Aguilar is a 27 year old female s/p bilateral breast reduction 10/19/2022, presents for follow up for post op stable hematoma.     S: doing ok since last visit. No pain. Still notes some swelling and asymmetry. Using neosporin once daily to incisions. Wearing compression bra. Has returned to work at dest job.     O:  /75   Pulse 105   Temp 98.3  F (36.8  C) (Oral)   SpO2 96%    General: NAD  Chest: bilateral chest scars well healed, pink. Nipple intact, viable. R breast overall soft with residual ecchymosis inferior pole, moderate edema to lateral breast. L breast with mild residual ecchymosis and dermal edema to lower pole.     A/P:  -healing well, hematoma resolving  -stop neosporin, start aquafor/plain lotion once daily  -continue compression bra  -RTC with Dr. Govea in 2 weeks    Marj Dunn PA-C  Plastic and Reconstructive Surgery    10 minutes spent on the date of the encounter doing chart review, history and physical, dressing changes, documentation and further activity as noted above.

## 2022-11-30 ENCOUNTER — OFFICE VISIT (OUTPATIENT)
Dept: PLASTIC SURGERY | Facility: CLINIC | Age: 27
End: 2022-11-30
Payer: COMMERCIAL

## 2022-11-30 VITALS
DIASTOLIC BLOOD PRESSURE: 73 MMHG | HEART RATE: 87 BPM | OXYGEN SATURATION: 99 % | TEMPERATURE: 98.3 F | SYSTOLIC BLOOD PRESSURE: 126 MMHG

## 2022-11-30 DIAGNOSIS — N62 MACROMASTIA: Primary | ICD-10-CM

## 2022-11-30 PROCEDURE — 99024 POSTOP FOLLOW-UP VISIT: CPT | Performed by: PLASTIC SURGERY

## 2022-11-30 ASSESSMENT — PAIN SCALES - GENERAL: PAINLEVEL: NO PAIN (0)

## 2022-11-30 NOTE — LETTER
11/30/2022       RE: Freida Aguilar  [address]  [place] [address]     Dear Colleague,    Thank you for referring your patient, Freida Aguilar, to the [facility] PLASTIC AND RECONSTRUCTIVE SURGERY CLINIC [place] at [facility]. Please see a copy of my visit note below.    PRESENTING COMPLAINT:  Postoperative visit status post bilateral breast reduction done on 10/19/2022.    HISTORY OF PRESENTING COMPLAINT:  Freida is 27 years old, 6 weeks out from surgery.  Fully healed.  Happy with results.  No issues.  Pathology benign.    PHYSICAL EXAMINATION:  Vital signs stable.  Temperature is afebrile, in no obvious distress.  Everything has healed in well.    ASSESSMENT AND PLAN:  Based on above findings, a diagnosis of bilateral breast reduction was made.  Advised aggressive moisturization, let everything settle over the next 6 months to a year.  Get to know her breasts well.  All questions were answered.  She was happy with the visit.  All exam and discussion done in presence of my nurse, Soha Kahn.        Sincerely,    RANJEET Govea MD

## 2022-11-30 NOTE — NURSING NOTE
Chief Complaint   Patient presents with     Surgical Followup     4 week follow up        Vitals:    11/30/22 0941   BP: 126/73   Pulse: 87   Temp: 98.3  F (36.8  C)   TempSrc: Oral   SpO2: 99%       There is no height or weight on file to calculate BMI.          MARY JANE MARCUS EMT

## 2022-11-30 NOTE — PROGRESS NOTES
PRESENTING COMPLAINT:  Postoperative visit status post bilateral breast reduction done on 10/19/2022.    HISTORY OF PRESENTING COMPLAINT:  Freida is 27 years old, 6 weeks out from surgery.  Fully healed.  Happy with results.  No issues.  Pathology benign.    PHYSICAL EXAMINATION:  Vital signs stable.  Temperature is afebrile, in no obvious distress.  Everything has healed in well.    ASSESSMENT AND PLAN:  Based on above findings, a diagnosis of bilateral breast reduction was made.  Advised aggressive moisturization, let everything settle over the next 6 months to a year.  Get to know her breasts well.  All questions were answered.  She was happy with the visit.  All exam and discussion done in presence of my nurse, Soha Kahn.

## 2023-03-06 ENCOUNTER — TRANSFERRED RECORDS (OUTPATIENT)
Dept: HEALTH INFORMATION MANAGEMENT | Facility: CLINIC | Age: 28
End: 2023-03-06

## 2023-08-05 ENCOUNTER — HEALTH MAINTENANCE LETTER (OUTPATIENT)
Age: 28
End: 2023-08-05

## 2024-03-07 ENCOUNTER — TRANSFERRED RECORDS (OUTPATIENT)
Dept: HEALTH INFORMATION MANAGEMENT | Facility: CLINIC | Age: 29
End: 2024-03-07

## 2024-08-09 RX ORDER — DULOXETIN HYDROCHLORIDE 20 MG/1
40 CAPSULE, DELAYED RELEASE ORAL DAILY
COMMUNITY
Start: 2024-06-15 | End: 2024-08-14

## 2024-08-13 ASSESSMENT — ANXIETY QUESTIONNAIRES
4. TROUBLE RELAXING: NOT AT ALL
5. BEING SO RESTLESS THAT IT IS HARD TO SIT STILL: NOT AT ALL
GAD7 TOTAL SCORE: 0
7. FEELING AFRAID AS IF SOMETHING AWFUL MIGHT HAPPEN: NOT AT ALL
2. NOT BEING ABLE TO STOP OR CONTROL WORRYING: NOT AT ALL
3. WORRYING TOO MUCH ABOUT DIFFERENT THINGS: NOT AT ALL
7. FEELING AFRAID AS IF SOMETHING AWFUL MIGHT HAPPEN: NOT AT ALL
GAD7 TOTAL SCORE: 0
GAD7 TOTAL SCORE: 0
6. BECOMING EASILY ANNOYED OR IRRITABLE: NOT AT ALL
1. FEELING NERVOUS, ANXIOUS, OR ON EDGE: NOT AT ALL

## 2024-08-13 ASSESSMENT — PATIENT HEALTH QUESTIONNAIRE - PHQ9
SUM OF ALL RESPONSES TO PHQ QUESTIONS 1-9: 4
SUM OF ALL RESPONSES TO PHQ QUESTIONS 1-9: 4

## 2024-08-14 ENCOUNTER — OFFICE VISIT (OUTPATIENT)
Dept: FAMILY MEDICINE | Facility: CLINIC | Age: 29
End: 2024-08-14
Payer: COMMERCIAL

## 2024-08-14 VITALS
SYSTOLIC BLOOD PRESSURE: 111 MMHG | BODY MASS INDEX: 35.61 KG/M2 | WEIGHT: 201 LBS | TEMPERATURE: 98 F | DIASTOLIC BLOOD PRESSURE: 82 MMHG | RESPIRATION RATE: 16 BRPM | OXYGEN SATURATION: 96 % | HEART RATE: 112 BPM | HEIGHT: 63 IN

## 2024-08-14 DIAGNOSIS — F41.9 ANXIETY AND DEPRESSION: ICD-10-CM

## 2024-08-14 DIAGNOSIS — F32.A ANXIETY AND DEPRESSION: ICD-10-CM

## 2024-08-14 DIAGNOSIS — I87.1 MAY-THURNER SYNDROME: Primary | ICD-10-CM

## 2024-08-14 PROBLEM — R10.84 ABDOMINAL PAIN, GENERALIZED: Status: RESOLVED | Noted: 2021-03-06 | Resolved: 2024-08-14

## 2024-08-14 PROBLEM — K21.9 GASTROESOPHAGEAL REFLUX DISEASE: Status: RESOLVED | Noted: 2020-02-14 | Resolved: 2024-08-14

## 2024-08-14 PROBLEM — R10.13 ABDOMINAL PAIN, EPIGASTRIC: Status: RESOLVED | Noted: 2021-03-02 | Resolved: 2024-08-14

## 2024-08-14 PROBLEM — R11.0 NAUSEA: Status: RESOLVED | Noted: 2021-03-06 | Resolved: 2024-08-14

## 2024-08-14 RX ORDER — DULOXETIN HYDROCHLORIDE 20 MG/1
40 CAPSULE, DELAYED RELEASE ORAL DAILY
Qty: 180 CAPSULE | Refills: 3 | Status: SHIPPED | OUTPATIENT
Start: 2024-08-14

## 2024-08-14 SDOH — HEALTH STABILITY: PHYSICAL HEALTH: ON AVERAGE, HOW MANY DAYS PER WEEK DO YOU ENGAGE IN MODERATE TO STRENUOUS EXERCISE (LIKE A BRISK WALK)?: 0 DAYS

## 2024-08-14 ASSESSMENT — SOCIAL DETERMINANTS OF HEALTH (SDOH): HOW OFTEN DO YOU GET TOGETHER WITH FRIENDS OR RELATIVES?: ONCE A WEEK

## 2024-08-14 NOTE — PROGRESS NOTES
Assessment & Plan   Problem List Items Addressed This Visit          Circulatory    May-Thurner syndrome - Primary     Other Visit Diagnoses       Anxiety and depression        Relevant Medications    DULoxetine (CYMBALTA) 20 MG capsule           Agreed to take over management of medication as prescribed above. Reviewed history and medications, but deferred full annual wellness exam today. Freida can return for this at her convenience.     Patient has been advised of split billing requirements and indicates understanding: Yes      Counseling  Appropriate preventive services were addressed with this patient via screening, questionnaire, or discussion as appropriate for fall prevention, nutrition, physical activity, Tobacco-use cessation, weight loss and cognition.  Checklist reviewing preventive services available has been given to the patient.  Reviewed patient's diet, addressing concerns and/or questions.     The longitudinal plan of care for the diagnosis(es)/condition(s) as documented were addressed during this visit. Due to the added complexity in care, I will continue to support Freida in the subsequent management and with ongoing continuity of care.    24 minutes spent on the date of the encounter doing chart review, history and exam, documentation and further activities as noted.      Isac Conley MD  4:24 PM, August 14, 2024        Subjective   Freida is a 29 year old, presenting for the following health issues:  Establish Care (Discuss medication management. )    HPI   New Patient   Acute Problem  Anxiety/Depression  - takes Cymbalta, 40mg daily  - has been on this med at this strength for the past two years  - no issues   - she's here today because her previous psychiatric provider left the clinic so Freida needs to find another prescribing provider  - denies SI or concerns for self harm  - PHQ and ASHANTI as noted below        5/6/2021     4:19 PM 7/6/2022     2:59 PM 8/13/2024     5:09 PM   PHQ   PHQ-9 Total Score  "3 1 4   Q9: Thoughts of better off dead/self-harm past 2 weeks Not at all Not at all Not at all         3/2/2021     5:07 PM 5/6/2021     4:19 PM 8/13/2024     5:09 PM   ASHANTI-7 SCORE   Total Score   0 (minimal anxiety)   Total Score 13 12 0         Review of Systems  Constitutional, HEENT, cardiovascular, pulmonary, gi and gu systems are negative, except as otherwise noted.      Objective    /82 (BP Location: Right arm, Patient Position: Sitting, Cuff Size: Adult Large)   Pulse 112   Temp 98  F (36.7  C) (Skin)   Resp 16   Ht 1.6 m (5' 3\")   Wt 91.2 kg (201 lb)   LMP 07/27/2024 (Exact Date)   SpO2 96%   BMI 35.61 kg/m    Body mass index is 35.61 kg/m .    Physical Exam   GENERAL: alert and no distress  NECK: no adenopathy, no asymmetry, masses, or scars  RESP: lungs clear to auscultation - no rales, rhonchi or wheezes  CV: regular rate and rhythm, normal S1 S2, no S3 or S4, no murmur, click or rub, no peripheral edema  ABDOMEN: soft, nontender, no hepatosplenomegaly, no masses and bowel sounds normal  MS: no gross musculoskeletal defects noted, no edema          Signed Electronically by: Isac Conley MD    Answers submitted by the patient for this visit:  Patient Health Questionnaire (Submitted on 8/13/2024)  PHQ9 TOTAL SCORE: 4  ASHANTI-7 (Submitted on 8/13/2024)  ASHANTI 7 TOTAL SCORE: 0    "

## 2024-08-14 NOTE — NURSING NOTE
"29 year old  Chief Complaint   Patient presents with    Establish Care     Discuss medication management.        Blood pressure 111/82, pulse 112, temperature 98  F (36.7  C), temperature source Skin, resp. rate 16, height 1.6 m (5' 3\"), weight 91.2 kg (201 lb), last menstrual period 07/27/2024, SpO2 96%, not currently breastfeeding. Body mass index is 35.61 kg/m .  Patient Active Problem List   Diagnosis    Abdominal pain, epigastric    Family history of hypothyroidism    Gastroesophageal reflux disease    History of DVT (deep vein thrombosis)    Hx of hemorrhoids    May-Thurner syndrome    History of pulmonary embolism    Abdominal pain, generalized    Vitamin D deficiency    Chronic constipation    Nausea    Macromastia       Wt Readings from Last 2 Encounters:   08/14/24 91.2 kg (201 lb)   10/19/22 81.6 kg (180 lb)     BP Readings from Last 3 Encounters:   08/14/24 111/82   11/30/22 126/73   11/15/22 120/75         Current Outpatient Medications   Medication Sig Dispense Refill    DULoxetine (CYMBALTA) 20 MG capsule Take 40 mg by mouth daily      multivitamin w/minerals (THERA-VIT-M) tablet       Vitamin D3 (CHOLECALCIFEROL) 25 mcg (1000 units) tablet       hyoscyamine SL (LEVSIN/SL) 0.125 MG sublingual tablet Take 1 tablet (0.125 mg) by mouth 4 times daily as needed (abdominal cramping) (Patient not taking: Reported on 8/14/2024) 60 tablet 3    propranolol (INDERAL) 10 MG tablet TAKE 1 TO 2 TABLETS BY MOUTH TWICE DAILY AS NEEDED FOR ANXIETY (Patient not taking: Reported on 8/14/2024)       No current facility-administered medications for this visit.       Social History     Tobacco Use    Smoking status: Never    Smokeless tobacco: Never   Substance Use Topics    Alcohol use: Not Currently    Drug use: Not Currently       Health Maintenance Due   Topic Date Due    ADVANCE CARE PLANNING  Never done    HIV SCREENING  Never done    HEPATITIS C SCREENING  Never done    YEARLY PREVENTIVE VISIT  06/09/2023       No " "results found for: \"PAP\"      August 14, 2024 3:54 PM   "

## 2024-08-19 ENCOUNTER — DOCUMENTATION ONLY (OUTPATIENT)
Dept: FAMILY MEDICINE | Facility: CLINIC | Age: 29
End: 2024-08-19

## 2024-08-19 NOTE — PROGRESS NOTES
Reviewed faxed documents from [facility]. Agree to managing Duloxetine 40mg daily as previously prescribed. Will scan records to chart.     Isac Conley MD  8:32 AM, August 19, 2024

## 2024-09-22 ENCOUNTER — HEALTH MAINTENANCE LETTER (OUTPATIENT)
Age: 29
End: 2024-09-22

## 2024-12-19 ENCOUNTER — OFFICE VISIT (OUTPATIENT)
Dept: URGENT CARE | Facility: URGENT CARE | Age: 29
End: 2024-12-19
Payer: COMMERCIAL

## 2024-12-19 VITALS
OXYGEN SATURATION: 97 % | BODY MASS INDEX: 36.85 KG/M2 | TEMPERATURE: 98.8 F | WEIGHT: 208 LBS | RESPIRATION RATE: 16 BRPM | DIASTOLIC BLOOD PRESSURE: 86 MMHG | SYSTOLIC BLOOD PRESSURE: 123 MMHG

## 2024-12-19 DIAGNOSIS — H69.92 DYSFUNCTION OF LEFT EUSTACHIAN TUBE: ICD-10-CM

## 2024-12-19 DIAGNOSIS — J32.9 BACTERIAL SINUSITIS: Primary | ICD-10-CM

## 2024-12-19 DIAGNOSIS — R82.90 BAD ODOR OF URINE: ICD-10-CM

## 2024-12-19 DIAGNOSIS — B96.89 BACTERIAL SINUSITIS: Primary | ICD-10-CM

## 2024-12-19 DIAGNOSIS — H92.02 LEFT EAR PAIN: ICD-10-CM

## 2024-12-19 DIAGNOSIS — J34.89 SINUS PAIN: ICD-10-CM

## 2024-12-19 LAB
ALBUMIN UR-MCNC: NEGATIVE MG/DL
APPEARANCE UR: CLEAR
BACTERIA #/AREA URNS HPF: ABNORMAL /HPF
BILIRUB UR QL STRIP: NEGATIVE
COLOR UR AUTO: YELLOW
GLUCOSE UR STRIP-MCNC: NEGATIVE MG/DL
HGB UR QL STRIP: NEGATIVE
KETONES UR STRIP-MCNC: NEGATIVE MG/DL
LEUKOCYTE ESTERASE UR QL STRIP: NEGATIVE
NITRATE UR QL: NEGATIVE
PH UR STRIP: 6 [PH] (ref 5–7)
RBC #/AREA URNS AUTO: ABNORMAL /HPF
SP GR UR STRIP: 1.01 (ref 1–1.03)
SQUAMOUS #/AREA URNS AUTO: ABNORMAL /LPF
UROBILINOGEN UR STRIP-ACNC: 0.2 E.U./DL
WBC #/AREA URNS AUTO: ABNORMAL /HPF

## 2024-12-19 RX ORDER — PREDNISONE 20 MG/1
20 TABLET ORAL 2 TIMES DAILY
Qty: 10 TABLET | Refills: 0 | Status: SHIPPED | OUTPATIENT
Start: 2024-12-19

## 2024-12-19 RX ORDER — FLUTICASONE PROPIONATE 50 MCG
1 SPRAY, SUSPENSION (ML) NASAL DAILY
Qty: 16 G | Refills: 0 | Status: SHIPPED | OUTPATIENT
Start: 2024-12-19

## 2024-12-19 NOTE — PROGRESS NOTES
Assessment & Plan     Bacterial sinusitis    You have been diagnosed with a bacterial sinus infection. Sinusitis can occur during a cold. It can also happen due to allergies to pollens and other particles in the air.  A sinus infection can sometimes cause fever, headache, and facial pain. There is often green or yellow fluid draining from the nose or into the back of the throat (post-nasal drip). This infection is treated with antibiotics.  Home care  Take the full course of antibiotics as instructed. Don't stop taking them, even when you feel better.  Drink plenty of water, hot tea, and other liquids as directed by the healthcare provider. This may help thin nasal mucus. It also may help your sinuses drain fluids.  Heat may help soothe painful areas of your face. Use a towel soaked in hot water. Or,  the shower and direct the warm spray onto your face. Using a vaporizer along with a menthol rub at night may also help soothe symptoms.     Hot showers  - amoxicillin-clavulanate (AUGMENTIN) 875-125 MG tablet  Dispense: 20 tablet; Refill: 0    Sinus pain    Prednisone for sinus pain  - predniSONE (DELTASONE) 20 MG tablet  Dispense: 10 tablet; Refill: 0    Left ear pain    Your child has a middle ear infection (acute otitis media). It's caused by bacteria and infects the space behind the eardrum. The eustachian tube connects the ear to the nasal passage. The eustachian tubes help drain fluid from the ears. They also keep the air pressure equal inside and outside the ears. These tubes are shorter and more horizontal in children. This makes it more likely for the tubes to become blocked. A blockage lets fluid and pressure build up in the middle ear. Bacteria can grow in this fluid and cause an ear infection. This infection is commonly known as an earache.   The main symptom of an ear infection is ear pain. Other symptoms may include pulling at the ear, being more fussy than usual, fever, decreased appetite, and  "vomiting or diarrhea. Your child s hearing may also be affected. Your child may have had a respiratory infection first.   After the infection goes away, your child may still have fluid in the middle ear. It may take weeks or months for this fluid to go away. During that time, your child may have temporary hearing loss    augmentin    Bad odor of urine    UA is normal  - UA with Microscopic reflex to Culture  - UA Microscopic with Reflex to Culture    Dysfunction of left eustachian tube      The eustachian (say \"you-STAY-shee-un\") tubes connect the middle ear on each side to the back of the throat. They keep air pressure stable in the ears. If your eustachian tubes become blocked, the air pressure in your ears changes. A quick change in air pressure can cause eustachian tubes to close up. This might happen when an airplane changes altitude or when a  goes up or down underwater. And a cold can make the tubes swell and block the fluid in the middle ear from draining out. That can cause pain.       - fluticasone (FLONASE) 50 MCG/ACT nasal spray  Dispense: 16 g; Refill: 0       At today's visit with Freida Aguilar , we discussed results, diagnosis, medications and formulated a plan.  We also discussed red flags for immediate return to clinic/ER, as well as indications for follow up with PCP if not improved in 3 days. Patient understood and agreed to plan. Freida Aguilar was discharged with stable vitals and has no further questions.       No follow-ups on file.    Jose R Dotson, [facility], PA-C  [facility] [place] Guan is a 29 year old female who presents to clinic today for the following health issues:  Chief Complaint   Patient presents with    Urgent Care     Possible sinus infection          12/19/2024     2:58 PM   Additional Questions   Roomed by Kanika Quick   Accompanied by self     HPI    Review of Systems  Constitutional, HEENT, cardiovascular, pulmonary, gi and gu " systems are negative, except as otherwise noted.      Objective    /86   Temp 98.8  F (37.1  C)   Resp 16   Wt 94.3 kg (208 lb)   SpO2 97%   BMI 36.85 kg/m    Physical Exam   GENERAL: alert and no distress  EYES: Eyes grossly normal to inspection, PERRL and conjunctivae and sclerae normal  HENT: normal cephalic/atraumatic, left ear: erythematous, nose and mouth without ulcers or lesions, oropharynx clear, and oral mucous membranes moist  NECK: no adenopathy, no asymmetry, masses, or scars  RESP: lungs clear to auscultation - no rales, rhonchi or wheezes  CV: regular rate and rhythm, normal S1 S2, no S3 or S4, no murmur, click or rub, no peripheral edema  MS: no gross musculoskeletal defects noted, no edema  SKIN: no suspicious lesions or rashes  NEURO: Normal strength and tone, mentation intact and speech normal  PSYCH: mentation appears normal, affect normal/bright      Results for orders placed or performed in visit on 12/19/24   UA with Microscopic reflex to Culture     Status: Normal    Specimen: Urine, Midstream   Result Value Ref Range    Color Urine Yellow Colorless, Straw, Light Yellow, Yellow    Appearance Urine Clear Clear    Glucose Urine Negative Negative mg/dL    Bilirubin Urine Negative Negative    Ketones Urine Negative Negative mg/dL    Specific Gravity Urine 1.015 1.003 - 1.035    Blood Urine Negative Negative    pH Urine 6.0 5.0 - 7.0    Protein Albumin Urine Negative Negative mg/dL    Urobilinogen Urine 0.2 0.2, 1.0 E.U./dL    Nitrite Urine Negative Negative    Leukocyte Esterase Urine Negative Negative   UA Microscopic with Reflex to Culture     Status: Abnormal   Result Value Ref Range    Bacteria Urine Few (A) None Seen /HPF    RBC Urine 0-2 0-2 /HPF /HPF    WBC Urine 0-5 0-5 /HPF /HPF    Squamous Epithelials Urine Moderate (A) None Seen /LPF    Narrative    Urine Culture not indicated

## (undated) DEVICE — BLADE KNIFE SURG 10 371110

## (undated) DEVICE — PEN MARKING SKIN W/PAPER RULER 31145785

## (undated) DEVICE — SOL NACL 0.9% IRRIG 500ML BOTTLE 2F7123

## (undated) DEVICE — LINEN TOWEL PACK X5 5464

## (undated) DEVICE — PAD ARMBOARD FOAM EGGCRATE 50676-378

## (undated) DEVICE — SUCTION CATH AIRLIFE TRI-FLO W/CONTROL PORT 14FR  T60C

## (undated) DEVICE — SU STRATAFIX MONOCRYL 3-0 SPIRAL PS-2 45CM SXMP1B107

## (undated) DEVICE — ENDO FORCEP BX CAPTURA PRO SPIKE G50696

## (undated) DEVICE — DRAPE IOBAN INCISE 23X17" 6650EZ

## (undated) DEVICE — GOWN IMPERVIOUS 2XL BLUE

## (undated) DEVICE — PEN MARKING SKIN W/LABELS 31145884

## (undated) DEVICE — ESU ELEC BLADE HEX-LOCKING 2.5" E1450X

## (undated) DEVICE — DRSG KERLIX 4 1/2"X4YDS ROLL 6730

## (undated) DEVICE — ESU PENCIL SMOKE EVAC W/ROCKER SWITCH 0703-047-000

## (undated) DEVICE — GLOVE PROTEXIS W/NEU-THERA 7.0  2D73TE70

## (undated) DEVICE — SUCTION TIP YANKAUER W/O VENT K86

## (undated) DEVICE — TUBING SUCTION 12"X1/4" N612

## (undated) DEVICE — CLOSURE SYS SKIN PREMIERPRO EXOFINFUSION 4X60CM 3473

## (undated) DEVICE — SPONGE LAP 18X18" X8435

## (undated) DEVICE — SPECIMEN CONTAINER 3OZ W/FORMALIN 59901

## (undated) DEVICE — PAD CHUX UNDERPAD 30X30"

## (undated) DEVICE — SOL WATER IRRIG 500ML BOTTLE 2F7113

## (undated) DEVICE — DRAPE U SPLIT 74X120" 29440

## (undated) DEVICE — ESU GROUND PAD ADULT W/CORD E7507

## (undated) DEVICE — ENDO BITE BLOCK ADULT OMNI-BLOC

## (undated) DEVICE — SUCTION MANIFOLD NEPTUNE 2 SYS 1 PORT 702-025-000

## (undated) DEVICE — PREP CHLORAPREP 26ML TINTED ORANGE  260815

## (undated) DEVICE — BNDG ELASTIC 6" DBL LENGTH UNSTERILE 6611-16

## (undated) DEVICE — GLOVE EXAM NITRILE LG PF LATEX FREE 5064

## (undated) DEVICE — KIT ENDO TURNOVER/PROCEDURE CARRY-ON 101822

## (undated) DEVICE — SYR 30ML SLIP TIP W/O NDL 302833

## (undated) DEVICE — STPL SKIN 35W 059037

## (undated) DEVICE — SU MONOCRYL 2-0 SH 27" UND Y417H

## (undated) DEVICE — PACK MINOR CUSTOM ASC

## (undated) RX ORDER — FENTANYL CITRATE 50 UG/ML
INJECTION, SOLUTION INTRAMUSCULAR; INTRAVENOUS
Status: DISPENSED
Start: 2022-10-19

## (undated) RX ORDER — PROPOFOL 10 MG/ML
INJECTION, EMULSION INTRAVENOUS
Status: DISPENSED
Start: 2022-10-19

## (undated) RX ORDER — GABAPENTIN 300 MG/1
CAPSULE ORAL
Status: DISPENSED
Start: 2022-10-19

## (undated) RX ORDER — FENTANYL CITRATE-0.9 % NACL/PF 10 MCG/ML
PLASTIC BAG, INJECTION (ML) INTRAVENOUS
Status: DISPENSED
Start: 2022-10-19

## (undated) RX ORDER — ONDANSETRON 2 MG/ML
INJECTION INTRAMUSCULAR; INTRAVENOUS
Status: DISPENSED
Start: 2022-10-19

## (undated) RX ORDER — ONDANSETRON 2 MG/ML
INJECTION INTRAMUSCULAR; INTRAVENOUS
Status: DISPENSED
Start: 2021-09-23

## (undated) RX ORDER — ENOXAPARIN SODIUM 100 MG/ML
INJECTION SUBCUTANEOUS
Status: DISPENSED
Start: 2022-10-19

## (undated) RX ORDER — FENTANYL CITRATE 50 UG/ML
INJECTION, SOLUTION INTRAMUSCULAR; INTRAVENOUS
Status: DISPENSED
Start: 2021-09-23

## (undated) RX ORDER — DIPHENHYDRAMINE HYDROCHLORIDE 50 MG/ML
INJECTION INTRAMUSCULAR; INTRAVENOUS
Status: DISPENSED
Start: 2021-09-23

## (undated) RX ORDER — OXYCODONE HYDROCHLORIDE 5 MG/1
TABLET ORAL
Status: DISPENSED
Start: 2022-10-19

## (undated) RX ORDER — GLYCOPYRROLATE 0.2 MG/ML
INJECTION INTRAMUSCULAR; INTRAVENOUS
Status: DISPENSED
Start: 2022-10-19

## (undated) RX ORDER — CEFAZOLIN SODIUM 1 G/3ML
INJECTION, POWDER, FOR SOLUTION INTRAMUSCULAR; INTRAVENOUS
Status: DISPENSED
Start: 2022-10-19

## (undated) RX ORDER — ACETAMINOPHEN 325 MG/1
TABLET ORAL
Status: DISPENSED
Start: 2022-10-19

## (undated) RX ORDER — DEXMEDETOMIDINE HYDROCHLORIDE 4 UG/ML
INJECTION, SOLUTION INTRAVENOUS
Status: DISPENSED
Start: 2022-10-19